# Patient Record
Sex: FEMALE | Race: WHITE | Employment: OTHER | URBAN - METROPOLITAN AREA
[De-identification: names, ages, dates, MRNs, and addresses within clinical notes are randomized per-mention and may not be internally consistent; named-entity substitution may affect disease eponyms.]

---

## 2017-01-05 ENCOUNTER — OFFICE VISIT (OUTPATIENT)
Dept: INTERNAL MEDICINE CLINIC | Facility: CLINIC | Age: 77
End: 2017-01-05

## 2017-01-05 VITALS
DIASTOLIC BLOOD PRESSURE: 80 MMHG | HEART RATE: 69 BPM | SYSTOLIC BLOOD PRESSURE: 136 MMHG | RESPIRATION RATE: 16 BRPM | BODY MASS INDEX: 25.81 KG/M2 | WEIGHT: 162.5 LBS | TEMPERATURE: 98 F | HEIGHT: 66.5 IN | OXYGEN SATURATION: 98 %

## 2017-01-05 DIAGNOSIS — Z12.31 ENCOUNTER FOR SCREENING MAMMOGRAM FOR MALIGNANT NEOPLASM OF BREAST: ICD-10-CM

## 2017-01-05 DIAGNOSIS — Z00.00 ROUTINE GENERAL MEDICAL EXAMINATION AT A HEALTH CARE FACILITY: Primary | ICD-10-CM

## 2017-01-05 DIAGNOSIS — Z13.0 SCREENING, ANEMIA, DEFICIENCY, IRON: ICD-10-CM

## 2017-01-05 DIAGNOSIS — E78.00 PURE HYPERCHOLESTEROLEMIA: ICD-10-CM

## 2017-01-05 DIAGNOSIS — Z13.89 SCREENING FOR BLOOD OR PROTEIN IN URINE: ICD-10-CM

## 2017-01-05 DIAGNOSIS — Z79.899 ENCOUNTER FOR LONG-TERM (CURRENT) USE OF MEDICATIONS: ICD-10-CM

## 2017-01-05 DIAGNOSIS — Z13.29 SCREENING FOR THYROID DISORDER: ICD-10-CM

## 2017-01-05 PROCEDURE — G0439 PPPS, SUBSEQ VISIT: HCPCS | Performed by: INTERNAL MEDICINE

## 2017-01-05 NOTE — PROGRESS NOTES
Felix Sifuentes  10/10/1940 is a 77year old female. Patient presents with: Well Adult: AWV      HPI:   cpx -no new cc    Current Outpatient Prescriptions:  omega-3 fatty acids (FISH OIL) 1000 MG Oral Cap Take 1,000 mg by mouth daily.  Disp:  Rfl:    Ca constipation, diarrhea, difficulty swallowing, change in stools, nausea, vomiting no weight changes noted no heart burn noted. Hematology:   Patient denies abnormal bleeding, easy bleeding, easy bruising. Enlarged lymph nodes none.    Women Only:   Patien non-enlarged. Rebound tenderness: absent. Tenderness: absent . EXTREMITIES:   Clubbing: none. Cyanosis: absent . Edema: none. Pulses: present, bilateral.   Tremors: no.   Varicose veins: not present.    MUSCULOSKELETAL:   Cervical spines: normal issues and agrees to the plan. The patient is asked to Return in about 3 months (around 4/5/2017).   Marcellus Tolliver MD

## 2017-01-20 LAB
ABSOLUTE BASOPHILS: 39 CELLS/UL (ref 0–200)
ABSOLUTE EOSINOPHILS: 157 CELLS/UL (ref 15–500)
ABSOLUTE LYMPHOCYTES: 1982 CELLS/UL (ref 850–3900)
ABSOLUTE MONOCYTES: 364 CELLS/UL (ref 200–950)
ABSOLUTE NEUTROPHILS: 3058 CELLS/UL (ref 1500–7800)
ALBUMIN/GLOBULIN RATIO: 2.1 (CALC) (ref 1–2.5)
ALBUMIN: 4.2 G/DL (ref 3.6–5.1)
ALKALINE PHOSPHATASE: 80 U/L (ref 33–130)
ALT: 12 U/L (ref 6–29)
AST: 19 U/L (ref 10–35)
BASOPHILS: 0.7 %
BILIRUBIN, TOTAL: 0.5 MG/DL (ref 0.2–1.2)
BILIRUBIN: NEGATIVE
BUN: 15 MG/DL (ref 7–25)
CALCIUM: 9.5 MG/DL (ref 8.6–10.4)
CARBON DIOXIDE: 29 MMOL/L (ref 20–31)
CHLORIDE: 106 MMOL/L (ref 98–110)
CHOL/HDLC RATIO: 5 (CALC)
CHOLESTEROL, TOTAL: 230 MG/DL (ref 125–200)
COLOR: YELLOW
CREATININE: 0.89 MG/DL (ref 0.6–0.93)
EGFR IF AFRICN AM: 73 ML/MIN/1.73M2
EGFR IF NONAFRICN AM: 63 ML/MIN/1.73M2
EOSINOPHILS: 2.8 %
GLOBULIN: 2 G/DL (CALC) (ref 1.9–3.7)
GLUCOSE: 84 MG/DL (ref 65–99)
GLUCOSE: NEGATIVE
HDL CHOLESTEROL: 46 MG/DL
HEMATOCRIT: 41 % (ref 35–45)
HEMOGLOBIN A1C: 5.7 % OF TOTAL HGB
HEMOGLOBIN: 14 G/DL (ref 11.7–15.5)
KETONES: NEGATIVE
LDL-CHOLESTEROL: 160 MG/DL (CALC)
LYMPHOCYTES: 35.4 %
MCH: 31.8 PG (ref 27–33)
MCHC: 34.1 G/DL (ref 32–36)
MCV: 93.4 FL (ref 80–100)
MONOCYTES: 6.5 %
MPV: 9.4 FL (ref 7.5–11.5)
NEUTROPHILS: 54.6 %
NITRITE: NEGATIVE
NON-HDL CHOLESTEROL: 184 MG/DL (CALC)
PH: 7.5 (ref 5–8)
PLATELET COUNT: 170 THOUSAND/UL (ref 140–400)
POTASSIUM: 4.6 MMOL/L (ref 3.5–5.3)
PROTEIN, TOTAL: 6.2 G/DL (ref 6.1–8.1)
RDW: 13.3 % (ref 11–15)
RED BLOOD CELL COUNT: 4.39 MILLION/UL (ref 3.8–5.1)
SODIUM: 142 MMOL/L (ref 135–146)
SPECIFIC GRAVITY: 1.02 (ref 1–1.03)
T4 (THYROXINE), TOTAL: 7.5 MCG/DL (ref 4.5–12)
TRIGLYCERIDES: 121 MG/DL
TSH: 4.02 MIU/L (ref 0.4–4.5)
WHITE BLOOD CELL COUNT: 5.6 THOUSAND/UL (ref 3.8–10.8)

## 2017-08-08 ENCOUNTER — HOSPITAL ENCOUNTER (OUTPATIENT)
Dept: MAMMOGRAPHY | Age: 77
Discharge: HOME | End: 2017-08-08
Attending: INTERNAL MEDICINE
Payer: MEDICARE

## 2017-08-08 DIAGNOSIS — Z12.31 ENCOUNTER FOR SCREENING MAMMOGRAM FOR MALIGNANT NEOPLASM OF BREAST: ICD-10-CM

## 2017-08-08 PROCEDURE — 77067 SCR MAMMO BI INCL CAD: CPT | Performed by: INTERNAL MEDICINE

## 2018-01-25 ENCOUNTER — OFFICE VISIT (OUTPATIENT)
Dept: INTERNAL MEDICINE CLINIC | Facility: CLINIC | Age: 78
End: 2018-01-25

## 2018-01-25 VITALS
WEIGHT: 166.75 LBS | HEART RATE: 72 BPM | HEIGHT: 66 IN | DIASTOLIC BLOOD PRESSURE: 86 MMHG | TEMPERATURE: 98 F | RESPIRATION RATE: 14 BRPM | BODY MASS INDEX: 26.8 KG/M2 | OXYGEN SATURATION: 97 % | SYSTOLIC BLOOD PRESSURE: 134 MMHG

## 2018-01-25 DIAGNOSIS — Z00.00 ROUTINE GENERAL MEDICAL EXAMINATION AT A HEALTH CARE FACILITY: Primary | ICD-10-CM

## 2018-01-25 DIAGNOSIS — E78.00 PURE HYPERCHOLESTEROLEMIA: ICD-10-CM

## 2018-01-25 PROCEDURE — G0009 ADMIN PNEUMOCOCCAL VACCINE: HCPCS | Performed by: INTERNAL MEDICINE

## 2018-01-25 PROCEDURE — 93000 ELECTROCARDIOGRAM COMPLETE: CPT | Performed by: INTERNAL MEDICINE

## 2018-01-25 PROCEDURE — 90670 PCV13 VACCINE IM: CPT | Performed by: INTERNAL MEDICINE

## 2018-01-25 PROCEDURE — G0439 PPPS, SUBSEQ VISIT: HCPCS | Performed by: INTERNAL MEDICINE

## 2018-01-25 NOTE — PROGRESS NOTES
Ted Sifuentes  10/10/1940 is a 69year old female. Patient presents with:  Physical: Est Pt. Annual medicare wellness       HPI:   cpx -no new cc    Current Outpatient Prescriptions:  BABY ASPIRIN OR Take by mouth.  Disp:  Rfl:    omega-3 fatty acids ( (paroxsymal nocturnal dyspnea) none. Gastrointestinal:   Patient denies abdominal pain, blood in stool, constipation, diarrhea, difficulty swallowing, change in stools, nausea, vomiting no weight changes noted no heart burn noted.    Hematology:   Patient Percussion: normal.   Rales: no .   Respiratory effort: normal .   Rhonchi: no.   Wheezes: no. ABDOMEN:   General: normal.   Hernia: absent. Inguinal nodes: none. Liver, Spleen: non-enlarged. Rebound tenderness: absent. Tenderness: absent .    E indicates understanding of these issues and agrees to the plan. The patient is asked to Return in about 1 year (around 1/25/2019).   Anjel Carpenter MD

## 2018-02-05 ENCOUNTER — OFFICE VISIT (OUTPATIENT)
Dept: INTERNAL MEDICINE CLINIC | Facility: CLINIC | Age: 78
End: 2018-02-05

## 2018-02-05 VITALS
HEART RATE: 86 BPM | BODY MASS INDEX: 26 KG/M2 | WEIGHT: 164 LBS | TEMPERATURE: 99 F | RESPIRATION RATE: 16 BRPM | DIASTOLIC BLOOD PRESSURE: 80 MMHG | OXYGEN SATURATION: 96 % | SYSTOLIC BLOOD PRESSURE: 152 MMHG

## 2018-02-05 DIAGNOSIS — J04.0 LARYNGITIS: Primary | ICD-10-CM

## 2018-02-05 PROCEDURE — 99213 OFFICE O/P EST LOW 20 MIN: CPT | Performed by: INTERNAL MEDICINE

## 2018-02-05 RX ORDER — GUAIFENESIN AND DEXTROMETHORPHAN HYDROBROMIDE 1200; 60 MG/1; MG/1
1 TABLET, EXTENDED RELEASE ORAL EVERY 12 HOURS
Qty: 20 TABLET | Refills: 0 | Status: SHIPPED | OUTPATIENT
Start: 2018-02-05 | End: 2018-02-15

## 2018-02-05 RX ORDER — PREDNISONE 1 MG/1
TABLET ORAL
Qty: 26 TABLET | Refills: 0 | Status: SHIPPED | OUTPATIENT
Start: 2018-02-05 | End: 2018-03-22

## 2018-02-05 NOTE — PROGRESS NOTES
Shobha Sifuentes  10/10/1940 is a 69year old female who presents for upper respiratory symptoms    Patient presents with:  Cough        HPI:   Pt reports  respiratory symptoms for 2 days. Losing voice nonproductive cough no fever.   No body aches      Curr clear  ENT: ears neg throat neg  NECK: supple, neg adenopathy,no bruits  LUNGS: clear to auscultation. air entry equal.no chest wall tenderness.  wheeze neg  CARDIO: RRR without murmur  GI: good BS's,no masses, HSM or tenderness    ASSESSMENT AND PLAN:   Myah Paredes

## 2018-03-09 LAB
ABSOLUTE BASOPHILS: 62 CELLS/UL (ref 0–200)
ABSOLUTE EOSINOPHILS: 174 CELLS/UL (ref 15–500)
ABSOLUTE LYMPHOCYTES: 1764 CELLS/UL (ref 850–3900)
ABSOLUTE MONOCYTES: 414 CELLS/UL (ref 200–950)
ABSOLUTE NEUTROPHILS: 3186 CELLS/UL (ref 1500–7800)
ALBUMIN/GLOBULIN RATIO: 2.3 (CALC) (ref 1–2.5)
ALBUMIN: 4.1 G/DL (ref 3.6–5.1)
ALKALINE PHOSPHATASE: 73 U/L (ref 33–130)
ALT: 17 U/L (ref 6–29)
AST: 21 U/L (ref 10–35)
BASOPHILS: 1.1 %
BILIRUBIN, TOTAL: 0.5 MG/DL (ref 0.2–1.2)
BILIRUBIN: NEGATIVE
BUN: 12 MG/DL (ref 7–25)
CALCIUM: 9.3 MG/DL (ref 8.6–10.4)
CARBON DIOXIDE: 28 MMOL/L (ref 20–31)
CHLORIDE: 107 MMOL/L (ref 98–110)
CHOL/HDLC RATIO: 5.1 (CALC)
CHOLESTEROL, TOTAL: 209 MG/DL
COLOR: YELLOW
CREATININE: 0.92 MG/DL (ref 0.6–0.93)
EGFR IF AFRICN AM: 70 ML/MIN/1.73M2
EGFR IF NONAFRICN AM: 60 ML/MIN/1.73M2
EOSINOPHILS: 3.1 %
GLOBULIN: 1.8 G/DL (CALC) (ref 1.9–3.7)
GLUCOSE: 91 MG/DL (ref 65–99)
GLUCOSE: NEGATIVE
HDL CHOLESTEROL: 41 MG/DL
HEMATOCRIT: 39.7 % (ref 35–45)
HEMOGLOBIN A1C: 5.3 % OF TOTAL HGB
HEMOGLOBIN: 13.6 G/DL (ref 11.7–15.5)
KETONES: NEGATIVE
LDL-CHOLESTEROL: 141 MG/DL (CALC)
LYMPHOCYTES: 31.5 %
MCH: 31.6 PG (ref 27–33)
MCHC: 34.3 G/DL (ref 32–36)
MCV: 92.3 FL (ref 80–100)
MONOCYTES: 7.4 %
MPV: 11.2 FL (ref 7.5–12.5)
NEUTROPHILS: 56.9 %
NITRITE: NEGATIVE
NON-HDL CHOLESTEROL: 168 MG/DL (CALC)
PH: 7 (ref 5–8)
PLATELET COUNT: 202 THOUSAND/UL (ref 140–400)
POTASSIUM: 4.3 MMOL/L (ref 3.5–5.3)
PROTEIN, TOTAL: 5.9 G/DL (ref 6.1–8.1)
RDW: 12.5 % (ref 11–15)
RED BLOOD CELL COUNT: 4.3 MILLION/UL (ref 3.8–5.1)
SODIUM: 143 MMOL/L (ref 135–146)
SPECIFIC GRAVITY: 1.02 (ref 1–1.03)
T4 (THYROXINE), TOTAL: 6.9 MCG/DL (ref 4.5–12)
TRIGLYCERIDES: 148 MG/DL
TSH: 4.62 MIU/L (ref 0.4–4.5)
WHITE BLOOD CELL COUNT: 5.6 THOUSAND/UL (ref 3.8–10.8)

## 2018-03-22 ENCOUNTER — OFFICE VISIT (OUTPATIENT)
Dept: INTERNAL MEDICINE CLINIC | Facility: CLINIC | Age: 78
End: 2018-03-22

## 2018-03-22 VITALS
WEIGHT: 164 LBS | OXYGEN SATURATION: 96 % | HEIGHT: 66 IN | BODY MASS INDEX: 26.36 KG/M2 | TEMPERATURE: 98 F | DIASTOLIC BLOOD PRESSURE: 82 MMHG | HEART RATE: 72 BPM | SYSTOLIC BLOOD PRESSURE: 142 MMHG | RESPIRATION RATE: 13 BRPM

## 2018-03-22 DIAGNOSIS — E78.00 PURE HYPERCHOLESTEROLEMIA: Primary | ICD-10-CM

## 2018-03-22 DIAGNOSIS — R79.89 ABNORMAL THYROID BLOOD TEST: ICD-10-CM

## 2018-03-22 PROCEDURE — 99213 OFFICE O/P EST LOW 20 MIN: CPT | Performed by: INTERNAL MEDICINE

## 2018-03-22 RX ORDER — PRAVASTATIN SODIUM 20 MG
20 TABLET ORAL NIGHTLY
Qty: 30 TABLET | Refills: 2 | Status: SHIPPED | OUTPATIENT
Start: 2018-03-22 | End: 2018-04-09

## 2018-03-22 NOTE — PROGRESS NOTES
Rossana Sifuentes  10/10/1940 is a 69year old female. Patient presents with:  Lab Results      HPI:   Here for lab discussion.   Agreeable to statin therapy    Current Outpatient Prescriptions:  Pravastatin Sodium (PRAVACHOL) 20 MG Oral Tab Take 1 tablet about 3 months (around 6/22/2018).   Silvina Morris MD

## 2018-09-12 ENCOUNTER — TELEPHONE (OUTPATIENT)
Dept: INTERNAL MEDICINE CLINIC | Facility: CLINIC | Age: 78
End: 2018-09-12

## 2018-09-12 NOTE — TELEPHONE ENCOUNTER
Pt states she was seen by Dr Salud Hicks and referred to Dr Becky Estrella for colonoscopy. Requesting order for colonoscopy. Pt informed no order is needed from our office and to proceed with scheduling appointment with Dr Becky Estrella.

## 2018-09-26 PROBLEM — Z12.11 SPECIAL SCREENING FOR MALIGNANT NEOPLASM OF COLON: Status: ACTIVE | Noted: 2018-09-26

## 2018-10-04 ENCOUNTER — TELEPHONE (OUTPATIENT)
Dept: INTERNAL MEDICINE CLINIC | Facility: CLINIC | Age: 78
End: 2018-10-04

## 2018-10-04 DIAGNOSIS — Z12.39 SCREENING FOR MALIGNANT NEOPLASM OF BREAST: Primary | ICD-10-CM

## 2018-10-09 ENCOUNTER — HOSPITAL ENCOUNTER (OUTPATIENT)
Dept: MAMMOGRAPHY | Age: 78
Discharge: HOME | End: 2018-10-09
Attending: INTERNAL MEDICINE
Payer: MEDICARE

## 2018-10-09 DIAGNOSIS — Z12.39 SCREENING FOR MALIGNANT NEOPLASM OF BREAST: ICD-10-CM

## 2018-10-09 PROCEDURE — 77063 BREAST TOMOSYNTHESIS BI: CPT | Performed by: INTERNAL MEDICINE

## 2018-10-09 PROCEDURE — 77067 SCR MAMMO BI INCL CAD: CPT | Performed by: INTERNAL MEDICINE

## 2019-04-25 ENCOUNTER — OFFICE VISIT (OUTPATIENT)
Dept: INTERNAL MEDICINE CLINIC | Facility: CLINIC | Age: 79
End: 2019-04-25
Payer: MEDICARE

## 2019-04-25 VITALS
WEIGHT: 157.75 LBS | SYSTOLIC BLOOD PRESSURE: 176 MMHG | BODY MASS INDEX: 25.35 KG/M2 | OXYGEN SATURATION: 97 % | TEMPERATURE: 98 F | RESPIRATION RATE: 12 BRPM | HEIGHT: 66.25 IN | HEART RATE: 72 BPM | DIASTOLIC BLOOD PRESSURE: 84 MMHG

## 2019-04-25 DIAGNOSIS — I10 ESSENTIAL HYPERTENSION: ICD-10-CM

## 2019-04-25 DIAGNOSIS — Z00.00 ROUTINE GENERAL MEDICAL EXAMINATION AT A HEALTH CARE FACILITY: Primary | ICD-10-CM

## 2019-04-25 PROBLEM — Z12.11 SPECIAL SCREENING FOR MALIGNANT NEOPLASM OF COLON: Status: RESOLVED | Noted: 2018-09-26 | Resolved: 2019-04-25

## 2019-04-25 PROCEDURE — 96160 PT-FOCUSED HLTH RISK ASSMT: CPT | Performed by: INTERNAL MEDICINE

## 2019-04-25 PROCEDURE — 99397 PER PM REEVAL EST PAT 65+ YR: CPT | Performed by: INTERNAL MEDICINE

## 2019-04-25 PROCEDURE — G0439 PPPS, SUBSEQ VISIT: HCPCS | Performed by: INTERNAL MEDICINE

## 2019-04-25 PROCEDURE — 93000 ELECTROCARDIOGRAM COMPLETE: CPT | Performed by: INTERNAL MEDICINE

## 2019-04-25 RX ORDER — AMLODIPINE BESYLATE AND BENAZEPRIL HYDROCHLORIDE 5; 10 MG/1; MG/1
1 CAPSULE ORAL DAILY
Qty: 30 CAPSULE | Refills: 2 | Status: SHIPPED | OUTPATIENT
Start: 2019-04-25 | End: 2019-05-06

## 2019-04-25 RX ORDER — UBIQUINOL 100 MG
2 CAPSULE ORAL DAILY
COMMUNITY
Start: 2019-01-24

## 2019-04-25 RX ORDER — MULTIVITAMIN
1 TABLET ORAL DAILY
COMMUNITY

## 2019-04-25 NOTE — PROGRESS NOTES
Gokul Sifuentes  10/10/1940 is a 69year old female. Patient presents with:  Physical      HPI:   cpx -no new cc    Current Outpatient Medications:  Glucosamine 750 MG Oral Tab Take 2 tablets by mouth daily.  Disp:  Rfl:    Multiple Vitamin (MULTI-VITAMI neck stiffness or pain. Endocrine:   Diabetes none. Thyroid disorder none. Respiratory:   Patient denies chest pain, cough, MARTINEZ (dyspnea on exertion), chest congestion, blood-tinged sputum/wheezing. Breathing normal pattern .    Cardiovascular:   Patien canals: normal.   EOM: within normal limit-conjunctiva normal.   Head: normocephalic. Nasal septum: midline. Nose: unremarkable. Oral cavity: normal.   Pupils: normal, bilaterally .    Sclera: normal.   Turbinates: normal.   NECK:   Carotid bruit: non general medical examination at a health care facility  -     CBC WITH DIFFERENTIAL WITH PLATELET  -     COMP METABOLIC PANEL (14)  -     ELECTROCARDIOGRAM, COMPLETE  -     HEMOGLOBIN A1C  -     LIPID PANEL  -     T4(THYROXINE TOTAL)  -     ASSAY, THYROID S

## 2019-05-06 ENCOUNTER — TELEPHONE (OUTPATIENT)
Dept: INTERNAL MEDICINE CLINIC | Facility: CLINIC | Age: 79
End: 2019-05-06

## 2019-05-06 DIAGNOSIS — I10 ESSENTIAL HYPERTENSION: ICD-10-CM

## 2019-05-06 RX ORDER — AMLODIPINE BESYLATE AND BENAZEPRIL HYDROCHLORIDE 5; 10 MG/1; MG/1
1 CAPSULE ORAL DAILY
Qty: 90 CAPSULE | Refills: 0 | Status: SHIPPED | OUTPATIENT
Start: 2019-05-06 | End: 2019-08-21

## 2019-05-06 NOTE — TELEPHONE ENCOUNTER
Medication sent to mail order pharmacy and cancelled at local pharmacy. Pt notified and she verbalized understanding.

## 2019-05-06 NOTE — TELEPHONE ENCOUNTER
Pt pharamcy is [facility] (not [place]) resend rx to [facility] order     amLODIPine Besy-Benazepril HCl (LOTREL) 5-10 MG Oral Cap

## 2019-06-10 ENCOUNTER — OFFICE VISIT (OUTPATIENT)
Dept: INTERNAL MEDICINE CLINIC | Facility: CLINIC | Age: 79
End: 2019-06-10
Payer: MEDICARE

## 2019-06-10 VITALS
TEMPERATURE: 98 F | DIASTOLIC BLOOD PRESSURE: 74 MMHG | HEART RATE: 80 BPM | BODY MASS INDEX: 25.59 KG/M2 | RESPIRATION RATE: 16 BRPM | HEIGHT: 66.25 IN | SYSTOLIC BLOOD PRESSURE: 126 MMHG | WEIGHT: 159.25 LBS | OXYGEN SATURATION: 97 %

## 2019-06-10 DIAGNOSIS — I10 ESSENTIAL HYPERTENSION: Primary | ICD-10-CM

## 2019-06-10 PROCEDURE — 99213 OFFICE O/P EST LOW 20 MIN: CPT | Performed by: INTERNAL MEDICINE

## 2019-06-10 NOTE — PROGRESS NOTES
Troy Sifuentes  10/10/1940 is a 69year old female. Patient presents with:  Medication Follow-Up       HPI:       Current Outpatient Medications:  amLODIPine Besy-Benazepril HCl (LOTREL) 5-10 MG Oral Cap Take 1 capsule by mouth daily.  Disp: 90 capsule normal .   Ears: unremarkable. Mouth: unremarkable. Nasal septum: midline. Pharynx: normal.   Sinuses: non-tender. HEART:   Clicks: no.   Distal Pulses Palpable: yes. Edema: none visible . Gallop: no .   Heart sounds: normal S1S2.    Murmurs: no

## 2019-06-13 ENCOUNTER — APPOINTMENT (OUTPATIENT)
Dept: LAB | Age: 79
End: 2019-06-13
Attending: INTERNAL MEDICINE
Payer: MEDICARE

## 2019-06-13 PROCEDURE — 80053 COMPREHEN METABOLIC PANEL: CPT | Performed by: INTERNAL MEDICINE

## 2019-06-13 PROCEDURE — 36415 COLL VENOUS BLD VENIPUNCTURE: CPT | Performed by: INTERNAL MEDICINE

## 2019-06-13 PROCEDURE — 83036 HEMOGLOBIN GLYCOSYLATED A1C: CPT | Performed by: INTERNAL MEDICINE

## 2019-06-13 PROCEDURE — 85025 COMPLETE CBC W/AUTO DIFF WBC: CPT | Performed by: INTERNAL MEDICINE

## 2019-06-13 PROCEDURE — 80061 LIPID PANEL: CPT | Performed by: INTERNAL MEDICINE

## 2019-06-13 PROCEDURE — 81001 URINALYSIS AUTO W/SCOPE: CPT | Performed by: INTERNAL MEDICINE

## 2019-06-13 PROCEDURE — 84443 ASSAY THYROID STIM HORMONE: CPT | Performed by: INTERNAL MEDICINE

## 2019-06-13 PROCEDURE — 84436 ASSAY OF TOTAL THYROXINE: CPT | Performed by: INTERNAL MEDICINE

## 2019-06-14 ENCOUNTER — TELEPHONE (OUTPATIENT)
Dept: INTERNAL MEDICINE CLINIC | Facility: CLINIC | Age: 79
End: 2019-06-14

## 2019-06-14 DIAGNOSIS — R82.90 ABNORMAL URINALYSIS: Primary | ICD-10-CM

## 2019-06-14 DIAGNOSIS — E78.00 PURE HYPERCHOLESTEROLEMIA: Chronic | ICD-10-CM

## 2019-06-14 RX ORDER — PRAVASTATIN SODIUM 20 MG
20 TABLET ORAL NIGHTLY
Qty: 90 TABLET | Refills: 3 | Status: SHIPPED | OUTPATIENT
Start: 2019-06-14 | End: 2019-09-12

## 2019-06-14 NOTE — TELEPHONE ENCOUNTER
Notes recorded by Alondra Keith MD on 6/14/2019 at 9:23 AM CDT  Reviewed results   LDL borderline high at 129  .  If she agreeable to taking medicines.  Also urine seems to be contaminated would like to repeat her urinalysis and urine culture  Rest of th

## 2019-06-17 ENCOUNTER — APPOINTMENT (OUTPATIENT)
Dept: LAB | Age: 79
End: 2019-06-17
Attending: INTERNAL MEDICINE
Payer: MEDICARE

## 2019-06-17 DIAGNOSIS — R82.90 ABNORMAL URINALYSIS: ICD-10-CM

## 2019-06-17 PROCEDURE — 81001 URINALYSIS AUTO W/SCOPE: CPT

## 2019-06-17 PROCEDURE — 87086 URINE CULTURE/COLONY COUNT: CPT

## 2019-08-01 ENCOUNTER — APPOINTMENT (OUTPATIENT)
Dept: LAB | Age: 79
End: 2019-08-01
Attending: INTERNAL MEDICINE
Payer: MEDICARE

## 2019-08-01 DIAGNOSIS — E78.00 PURE HYPERCHOLESTEROLEMIA: Chronic | ICD-10-CM

## 2019-08-01 LAB
ALBUMIN SERPL-MCNC: 3.6 G/DL (ref 3.4–5)
ALP LIVER SERPL-CCNC: 82 U/L (ref 55–142)
ALT SERPL-CCNC: 21 U/L (ref 13–56)
AST SERPL-CCNC: 17 U/L (ref 15–37)
BILIRUB DIRECT SERPL-MCNC: <0.1 MG/DL (ref 0–0.2)
BILIRUB SERPL-MCNC: 0.5 MG/DL (ref 0.1–2)
CHOLEST SMN-MCNC: 144 MG/DL (ref ?–200)
HDLC SERPL-MCNC: 40 MG/DL (ref 40–59)
LDLC SERPL CALC-MCNC: 76 MG/DL (ref ?–100)
M PROTEIN MFR SERPL ELPH: 6.2 G/DL (ref 6.4–8.2)
NONHDLC SERPL-MCNC: 104 MG/DL (ref ?–130)
TRIGL SERPL-MCNC: 138 MG/DL (ref 30–149)
VLDLC SERPL CALC-MCNC: 28 MG/DL (ref 0–30)

## 2019-08-01 PROCEDURE — 36415 COLL VENOUS BLD VENIPUNCTURE: CPT

## 2019-08-01 PROCEDURE — 80061 LIPID PANEL: CPT

## 2019-08-01 PROCEDURE — 80076 HEPATIC FUNCTION PANEL: CPT

## 2019-08-06 ENCOUNTER — TELEPHONE (OUTPATIENT)
Dept: INTERNAL MEDICINE CLINIC | Facility: CLINIC | Age: 79
End: 2019-08-06

## 2019-08-06 DIAGNOSIS — E78.00 PURE HYPERCHOLESTEROLEMIA: Primary | Chronic | ICD-10-CM

## 2019-08-06 DIAGNOSIS — I10 ESSENTIAL HYPERTENSION: Chronic | ICD-10-CM

## 2019-08-06 NOTE — TELEPHONE ENCOUNTER
Notes recorded by Desirae Rubalcava MD on 8/1/2019 at 4:36 PM CDT  Reviewed results   Within acceptable limits continue present management recheck in 6 months    Left detailed message with results. - ok per HIPAA  Repeat labs ordered.

## 2019-08-12 DIAGNOSIS — I10 ESSENTIAL HYPERTENSION: ICD-10-CM

## 2019-08-21 RX ORDER — AMLODIPINE BESYLATE AND BENAZEPRIL HYDROCHLORIDE 5; 10 MG/1; MG/1
1 CAPSULE ORAL DAILY
Qty: 90 CAPSULE | Refills: 0 | Status: SHIPPED | OUTPATIENT
Start: 2019-08-21 | End: 2019-11-18

## 2019-08-21 NOTE — TELEPHONE ENCOUNTER
Passed protocol    Medication(s) to Refill:   Requested Prescriptions     Pending Prescriptions Disp Refills   • amLODIPine Besy-Benazepril HCl (LOTREL) 5-10 MG Oral Cap 90 capsule 0     Sig: Take 1 capsule by mouth daily.        Last Time Medication was Fi

## 2019-10-15 ENCOUNTER — TELEPHONE (OUTPATIENT)
Dept: INTERNAL MEDICINE CLINIC | Facility: CLINIC | Age: 79
End: 2019-10-15

## 2019-10-15 DIAGNOSIS — Z12.31 ENCOUNTER FOR SCREENING MAMMOGRAM FOR BREAST CANCER: Primary | ICD-10-CM

## 2019-10-15 NOTE — TELEPHONE ENCOUNTER
Patient called in requesting order for her yearly Mammogram screening. Please call patient back once order is put in.

## 2019-10-17 NOTE — TELEPHONE ENCOUNTER
Left detailed message notifying pt mammogram order placed with contact information to central scheduling. Advised for pt to call office with questions/concerns.

## 2019-10-18 ENCOUNTER — HOSPITAL ENCOUNTER (OUTPATIENT)
Dept: MAMMOGRAPHY | Age: 79
Discharge: HOME | End: 2019-10-18
Attending: INTERNAL MEDICINE
Payer: MEDICARE

## 2019-10-18 DIAGNOSIS — Z12.31 ENCOUNTER FOR SCREENING MAMMOGRAM FOR BREAST CANCER: ICD-10-CM

## 2019-10-18 PROCEDURE — 77063 BREAST TOMOSYNTHESIS BI: CPT | Performed by: INTERNAL MEDICINE

## 2019-10-18 PROCEDURE — 77067 SCR MAMMO BI INCL CAD: CPT | Performed by: INTERNAL MEDICINE

## 2019-11-18 DIAGNOSIS — I10 ESSENTIAL HYPERTENSION: ICD-10-CM

## 2019-11-19 RX ORDER — AMLODIPINE BESYLATE AND BENAZEPRIL HYDROCHLORIDE 5; 10 MG/1; MG/1
CAPSULE ORAL
Qty: 90 CAPSULE | Refills: 0 | Status: SHIPPED | OUTPATIENT
Start: 2019-11-19 | End: 2020-01-22

## 2019-11-19 NOTE — TELEPHONE ENCOUNTER
Amlodipine  Benazepril 5-10 mg 1 cap daily filled 8/21/19 90 with 0 refills     LOV 6/10/19  Return in about 6 months (around 12/10/2019). .  No upcoming apt on file   Labs 6/13/19

## 2020-01-03 ENCOUNTER — OFFICE VISIT (OUTPATIENT)
Dept: INTERNAL MEDICINE CLINIC | Facility: CLINIC | Age: 80
End: 2020-01-03
Payer: MEDICARE

## 2020-01-03 VITALS
RESPIRATION RATE: 14 BRPM | BODY MASS INDEX: 25.43 KG/M2 | HEART RATE: 79 BPM | DIASTOLIC BLOOD PRESSURE: 68 MMHG | OXYGEN SATURATION: 98 % | SYSTOLIC BLOOD PRESSURE: 110 MMHG | WEIGHT: 158.25 LBS | TEMPERATURE: 98 F | HEIGHT: 66.25 IN

## 2020-01-03 DIAGNOSIS — R73.03 PREDIABETES: ICD-10-CM

## 2020-01-03 DIAGNOSIS — E78.00 PURE HYPERCHOLESTEROLEMIA: Chronic | ICD-10-CM

## 2020-01-03 DIAGNOSIS — I10 ESSENTIAL HYPERTENSION: Primary | Chronic | ICD-10-CM

## 2020-01-03 PROBLEM — R79.89 ABNORMAL THYROID BLOOD TEST: Status: RESOLVED | Noted: 2018-03-22 | Resolved: 2020-01-03

## 2020-01-03 PROCEDURE — 99213 OFFICE O/P EST LOW 20 MIN: CPT | Performed by: INTERNAL MEDICINE

## 2020-01-03 RX ORDER — PRAVASTATIN SODIUM 20 MG
1 TABLET ORAL DAILY
COMMUNITY
Start: 2019-11-19 | End: 2020-04-07

## 2020-01-03 NOTE — PROGRESS NOTES
Jayde Sifuentes  10/10/1940 is a 67year old female.     Patient presents with:  Hypertension       HPI:    6Month check no complaints  Current Outpatient Medications   Medication Sig Dispense Refill   • Pravastatin Sodium 20 MG Oral Tab Take 1 tablet by mo HEENT:   jvp not raised. Ear canals: normal.   Ear drums: normal .   Ears: unremarkable. Mouth: unremarkable. Nasal septum: midline. Pharynx: normal.   Sinuses: non-tender. HEART:   Clicks: no.   Distal Pulses Palpable: yes.    Edema: none visib

## 2020-01-16 ENCOUNTER — LAB ENCOUNTER (OUTPATIENT)
Dept: LAB | Age: 80
End: 2020-01-16
Attending: INTERNAL MEDICINE
Payer: MEDICARE

## 2020-01-16 DIAGNOSIS — E78.00 PURE HYPERCHOLESTEROLEMIA: Chronic | ICD-10-CM

## 2020-01-16 DIAGNOSIS — I10 ESSENTIAL HYPERTENSION: ICD-10-CM

## 2020-01-16 LAB
ALBUMIN SERPL-MCNC: 3.8 G/DL (ref 3.4–5)
ALBUMIN/GLOB SERPL: 1.2 {RATIO} (ref 1–2)
ALP LIVER SERPL-CCNC: 83 U/L (ref 55–142)
ALT SERPL-CCNC: 27 U/L (ref 13–56)
ANION GAP SERPL CALC-SCNC: 6 MMOL/L (ref 0–18)
AST SERPL-CCNC: 20 U/L (ref 15–37)
BILIRUB DIRECT SERPL-MCNC: <0.1 MG/DL (ref 0–0.2)
BILIRUB SERPL-MCNC: 0.4 MG/DL (ref 0.1–2)
BUN BLD-MCNC: 14 MG/DL (ref 7–18)
BUN/CREAT SERPL: 20 (ref 10–20)
CALCIUM BLD-MCNC: 9.3 MG/DL (ref 8.5–10.1)
CHLORIDE SERPL-SCNC: 109 MMOL/L (ref 98–112)
CHOLEST SMN-MCNC: 181 MG/DL (ref ?–200)
CO2 SERPL-SCNC: 27 MMOL/L (ref 21–32)
CREAT BLD-MCNC: 0.7 MG/DL (ref 0.55–1.02)
EST. AVERAGE GLUCOSE BLD GHB EST-MCNC: 111 MG/DL (ref 68–126)
GLOBULIN PLAS-MCNC: 3.3 G/DL (ref 2.8–4.4)
GLUCOSE BLD-MCNC: 86 MG/DL (ref 70–99)
HBA1C MFR BLD HPLC: 5.5 % (ref ?–5.7)
HDLC SERPL-MCNC: 46 MG/DL (ref 40–59)
LDLC SERPL CALC-MCNC: 105 MG/DL (ref ?–100)
M PROTEIN MFR SERPL ELPH: 7.1 G/DL (ref 6.4–8.2)
NONHDLC SERPL-MCNC: 135 MG/DL (ref ?–130)
OSMOLALITY SERPL CALC.SUM OF ELEC: 294 MOSM/KG (ref 275–295)
PATIENT FASTING Y/N/NP: YES
PATIENT FASTING Y/N/NP: YES
POTASSIUM SERPL-SCNC: 4.6 MMOL/L (ref 3.5–5.1)
SODIUM SERPL-SCNC: 142 MMOL/L (ref 136–145)
TRIGL SERPL-MCNC: 150 MG/DL (ref 30–149)
VLDLC SERPL CALC-MCNC: 30 MG/DL (ref 0–30)

## 2020-01-16 PROCEDURE — 36415 COLL VENOUS BLD VENIPUNCTURE: CPT | Performed by: INTERNAL MEDICINE

## 2020-01-16 PROCEDURE — 80061 LIPID PANEL: CPT

## 2020-01-16 PROCEDURE — 82248 BILIRUBIN DIRECT: CPT

## 2020-01-16 PROCEDURE — 83036 HEMOGLOBIN GLYCOSYLATED A1C: CPT | Performed by: INTERNAL MEDICINE

## 2020-01-16 PROCEDURE — 80053 COMPREHEN METABOLIC PANEL: CPT | Performed by: INTERNAL MEDICINE

## 2020-01-20 DIAGNOSIS — I10 ESSENTIAL HYPERTENSION: ICD-10-CM

## 2020-01-22 RX ORDER — AMLODIPINE BESYLATE AND BENAZEPRIL HYDROCHLORIDE 5; 10 MG/1; MG/1
1 CAPSULE ORAL DAILY
Qty: 90 CAPSULE | Refills: 1 | Status: SHIPPED | OUTPATIENT
Start: 2020-01-22 | End: 2020-06-02

## 2020-01-22 NOTE — TELEPHONE ENCOUNTER
Passed protocol    Requesting AMLODIPINE BESY-BENAZEPRIL HCL 5-10 MG Oral Cap  LOV: 1/3/2020  RTC: 6 months  Last Relevant Labs: 1/16/2020  Filled: 11/19/19 #90 with 0 refills    No future appointments.

## 2020-01-24 ENCOUNTER — TELEPHONE (OUTPATIENT)
Dept: INTERNAL MEDICINE CLINIC | Facility: CLINIC | Age: 80
End: 2020-01-24

## 2020-01-24 DIAGNOSIS — E78.00 PURE HYPERCHOLESTEROLEMIA: Primary | Chronic | ICD-10-CM

## 2020-01-24 NOTE — TELEPHONE ENCOUNTER
Notes recorded by Cathie Reynolds MD on 1/17/2020 at 10:44 AM CST  Reviewed results   LDL marginally elevated at 105.  Recheck in 4 months    Pt notified of results and provider instructions. Pt verbalized understanding.   Repeat lab ordered

## 2020-04-07 RX ORDER — PRAVASTATIN SODIUM 20 MG
TABLET ORAL
Qty: 90 TABLET | Refills: 0 | Status: SHIPPED | OUTPATIENT
Start: 2020-04-07 | End: 2020-06-01

## 2020-06-01 DIAGNOSIS — I10 ESSENTIAL HYPERTENSION: ICD-10-CM

## 2020-06-01 RX ORDER — PRAVASTATIN SODIUM 20 MG
TABLET ORAL
Qty: 90 TABLET | Refills: 0 | Status: SHIPPED | OUTPATIENT
Start: 2020-06-01 | End: 2020-08-08

## 2020-06-01 NOTE — TELEPHONE ENCOUNTER
PRAVASTATIN SODIUM 20 MG Tablet  Protocol Passed     Called patient and left a detailed message to give the office back a call to schedule an appt for her cpx and bp follow up     LOV: 1/3/2020   RTC: 6 months   Upcoming OV: none scheduled   Filled: 4/7/20

## 2020-06-02 RX ORDER — AMLODIPINE BESYLATE AND BENAZEPRIL HYDROCHLORIDE 5; 10 MG/1; MG/1
CAPSULE ORAL
Qty: 90 CAPSULE | Refills: 1 | Status: SHIPPED | OUTPATIENT
Start: 2020-06-02 | End: 2020-11-16

## 2020-06-02 NOTE — TELEPHONE ENCOUNTER
Refill requested:   Requested Prescriptions     Pending Prescriptions Disp Refills   • AMLODIPINE BESY-BENAZEPRIL HCL 5-10 MG Oral Cap [Pharmacy Med Name: AMLODIPINE BESYLATE/BENAZEPRIL HYDROCHLORIDE 5-10 MG Capsule] 90 capsule 1     Sig: TAKE 1 CAPSULE EV

## 2020-06-26 ENCOUNTER — OFFICE VISIT (OUTPATIENT)
Dept: INTERNAL MEDICINE CLINIC | Facility: CLINIC | Age: 80
End: 2020-06-26
Payer: MEDICARE

## 2020-06-26 VITALS
BODY MASS INDEX: 24.86 KG/M2 | HEIGHT: 66.5 IN | TEMPERATURE: 98 F | RESPIRATION RATE: 16 BRPM | WEIGHT: 156.5 LBS | OXYGEN SATURATION: 97 % | SYSTOLIC BLOOD PRESSURE: 120 MMHG | DIASTOLIC BLOOD PRESSURE: 68 MMHG | HEART RATE: 72 BPM

## 2020-06-26 DIAGNOSIS — M81.0 SENILE OSTEOPOROSIS: ICD-10-CM

## 2020-06-26 DIAGNOSIS — I10 ESSENTIAL HYPERTENSION: Chronic | ICD-10-CM

## 2020-06-26 DIAGNOSIS — Z00.00 ROUTINE GENERAL MEDICAL EXAMINATION AT A HEALTH CARE FACILITY: Primary | ICD-10-CM

## 2020-06-26 DIAGNOSIS — E78.00 PURE HYPERCHOLESTEROLEMIA: Chronic | ICD-10-CM

## 2020-06-26 DIAGNOSIS — C44.90 SKIN CANCER: ICD-10-CM

## 2020-06-26 PROCEDURE — 96160 PT-FOCUSED HLTH RISK ASSMT: CPT | Performed by: INTERNAL MEDICINE

## 2020-06-26 PROCEDURE — 99397 PER PM REEVAL EST PAT 65+ YR: CPT | Performed by: INTERNAL MEDICINE

## 2020-06-26 PROCEDURE — G0439 PPPS, SUBSEQ VISIT: HCPCS | Performed by: INTERNAL MEDICINE

## 2020-06-26 PROCEDURE — 93000 ELECTROCARDIOGRAM COMPLETE: CPT | Performed by: INTERNAL MEDICINE

## 2020-06-26 NOTE — PROGRESS NOTES
REASON FOR VISIT:    Todd Real is a 67year old female who presents for a Medicare Annual Wellness visit.     Female      Patient Care Team: Patient Care Team:  Leticia Marte MD as PCP - General (Internal Medicine)  Bettie Bay MD (OPHTHALMOLOGY) 12/22/2015 11/14/2014   AST 15 - 37 U/L 20 17 19 - - - -   AST (SGOT) 10 - 35 U/L - - - 21 19 19 19   ALT 13 - 56 U/L 27 21 24 - - - -   ALT (SGPT) 6 - 29 U/L - - - 17 12 13 17     TSH and Free T4 Latest Ref Rng & Units 6/13/2019 3/8/2018 1/19/2017   TSH 0 Cognitive Assessment     What day of the week is this?: Correct  What month is it?: Correct  What year is it?: Correct  Recall \"Ball\": Correct  Recall \"Flag\": Correct  Recall \"Tree\": Correct         PREVENTATIVE SERVICES   INDICATIONS AND SCHEDUL ARM/ELBOW SURGERY UNLISTED      Broken arm      Family History   Problem Relation Age of Onset   • Diabetes Father    • Diabetes Mother    • Other (Liver failure) Other         Sibling, Family histroy of    • Ulcerative Colitis Son      Immunization Histor in stool, constipation, diarrhea, difficulty swallowing, change in stools, nausea, vomiting no weight changes noted no heart burn noted. Hematology:   Patient denies abnormal bleeding, easy bleeding, easy bruising. Enlarged lymph nodes none.    Women Only normal.   Rales: no .   Respiratory effort: normal .   Rhonchi: no.   Wheezes: no. ABDOMEN:   General: normal.   Hernia: absent. Inguinal nodes: none. Liver, Spleen: non-enlarged. Rebound tenderness: absent. Tenderness: absent .    EXTREMITIES: BONE DENSITOMETRY (CPT=77080);  Future    Skin cancer  -     DERM - INTERNAL    Other orders  -     Cancel: ELECTROCARDIOGRAM, COMPLETE    EKG shows the patient have a heart rate of 55 MI interval 170 ms sinus bradycardia negative T waves in the anterior le

## 2020-07-01 ENCOUNTER — APPOINTMENT (OUTPATIENT)
Dept: LAB | Age: 80
End: 2020-07-01
Attending: INTERNAL MEDICINE
Payer: MEDICARE

## 2020-07-01 DIAGNOSIS — E78.00 PURE HYPERCHOLESTEROLEMIA: Chronic | ICD-10-CM

## 2020-07-01 LAB
ALBUMIN SERPL-MCNC: 3.7 G/DL (ref 3.4–5)
ALBUMIN/GLOB SERPL: 1.4 {RATIO} (ref 1–2)
ALP LIVER SERPL-CCNC: 80 U/L (ref 55–142)
ALT SERPL-CCNC: 23 U/L (ref 13–56)
ANION GAP SERPL CALC-SCNC: 2 MMOL/L (ref 0–18)
AST SERPL-CCNC: 19 U/L (ref 15–37)
BASOPHILS # BLD AUTO: 0.07 X10(3) UL (ref 0–0.2)
BASOPHILS NFR BLD AUTO: 1 %
BILIRUB SERPL-MCNC: 0.4 MG/DL (ref 0.1–2)
BILIRUB UR QL STRIP.AUTO: NEGATIVE
BUN BLD-MCNC: 12 MG/DL (ref 7–18)
BUN/CREAT SERPL: 14.1 (ref 10–20)
CALCIUM BLD-MCNC: 9.1 MG/DL (ref 8.5–10.1)
CHLORIDE SERPL-SCNC: 110 MMOL/L (ref 98–112)
CHOLEST SMN-MCNC: 166 MG/DL (ref ?–200)
CO2 SERPL-SCNC: 29 MMOL/L (ref 21–32)
COLOR UR AUTO: YELLOW
CREAT BLD-MCNC: 0.85 MG/DL (ref 0.55–1.02)
DEPRECATED RDW RBC AUTO: 43.3 FL (ref 35.1–46.3)
EOSINOPHIL # BLD AUTO: 0.15 X10(3) UL (ref 0–0.7)
EOSINOPHIL NFR BLD AUTO: 2.2 %
ERYTHROCYTE [DISTWIDTH] IN BLOOD BY AUTOMATED COUNT: 12 % (ref 11–15)
EST. AVERAGE GLUCOSE BLD GHB EST-MCNC: 114 MG/DL (ref 68–126)
GLOBULIN PLAS-MCNC: 2.6 G/DL (ref 2.8–4.4)
GLUCOSE BLD-MCNC: 89 MG/DL (ref 70–99)
GLUCOSE UR STRIP.AUTO-MCNC: NEGATIVE MG/DL
HBA1C MFR BLD HPLC: 5.6 % (ref ?–5.7)
HCT VFR BLD AUTO: 41.3 % (ref 35–48)
HDLC SERPL-MCNC: 41 MG/DL (ref 40–59)
HGB BLD-MCNC: 13.4 G/DL (ref 12–16)
IMM GRANULOCYTES # BLD AUTO: 0.02 X10(3) UL (ref 0–1)
IMM GRANULOCYTES NFR BLD: 0.3 %
KETONES UR STRIP.AUTO-MCNC: NEGATIVE MG/DL
LDLC SERPL CALC-MCNC: 90 MG/DL (ref ?–100)
LYMPHOCYTES # BLD AUTO: 1.96 X10(3) UL (ref 1–4)
LYMPHOCYTES NFR BLD AUTO: 28.6 %
M PROTEIN MFR SERPL ELPH: 6.3 G/DL (ref 6.4–8.2)
MCH RBC QN AUTO: 31.7 PG (ref 26–34)
MCHC RBC AUTO-ENTMCNC: 32.4 G/DL (ref 31–37)
MCV RBC AUTO: 97.6 FL (ref 80–100)
MONOCYTES # BLD AUTO: 0.64 X10(3) UL (ref 0.1–1)
MONOCYTES NFR BLD AUTO: 9.3 %
NEUTROPHILS # BLD AUTO: 4.02 X10 (3) UL (ref 1.5–7.7)
NEUTROPHILS # BLD AUTO: 4.02 X10(3) UL (ref 1.5–7.7)
NEUTROPHILS NFR BLD AUTO: 58.6 %
NITRITE UR QL STRIP.AUTO: NEGATIVE
NONHDLC SERPL-MCNC: 125 MG/DL (ref ?–130)
OSMOLALITY SERPL CALC.SUM OF ELEC: 291 MOSM/KG (ref 275–295)
PATIENT FASTING Y/N/NP: YES
PATIENT FASTING Y/N/NP: YES
PH UR STRIP.AUTO: 7 [PH] (ref 4.5–8)
PLATELET # BLD AUTO: 199 10(3)UL (ref 150–450)
POTASSIUM SERPL-SCNC: 4.1 MMOL/L (ref 3.5–5.1)
PROT UR STRIP.AUTO-MCNC: NEGATIVE MG/DL
RBC # BLD AUTO: 4.23 X10(6)UL (ref 3.8–5.3)
SODIUM SERPL-SCNC: 141 MMOL/L (ref 136–145)
SP GR UR STRIP.AUTO: 1.01 (ref 1–1.03)
T4 FREE SERPL-MCNC: 0.9 NG/DL (ref 0.8–1.7)
TRIGL SERPL-MCNC: 175 MG/DL (ref 30–149)
TSI SER-ACNC: 3.81 MIU/ML (ref 0.36–3.74)
UROBILINOGEN UR STRIP.AUTO-MCNC: <2 MG/DL
VLDLC SERPL CALC-MCNC: 35 MG/DL (ref 0–30)
WBC # BLD AUTO: 6.9 X10(3) UL (ref 4–11)

## 2020-07-01 PROCEDURE — 83036 HEMOGLOBIN GLYCOSYLATED A1C: CPT | Performed by: INTERNAL MEDICINE

## 2020-07-01 PROCEDURE — 84439 ASSAY OF FREE THYROXINE: CPT | Performed by: INTERNAL MEDICINE

## 2020-07-01 PROCEDURE — 85025 COMPLETE CBC W/AUTO DIFF WBC: CPT | Performed by: INTERNAL MEDICINE

## 2020-07-01 PROCEDURE — 81001 URINALYSIS AUTO W/SCOPE: CPT | Performed by: INTERNAL MEDICINE

## 2020-07-01 PROCEDURE — 84443 ASSAY THYROID STIM HORMONE: CPT | Performed by: INTERNAL MEDICINE

## 2020-07-01 PROCEDURE — 80053 COMPREHEN METABOLIC PANEL: CPT | Performed by: INTERNAL MEDICINE

## 2020-07-01 PROCEDURE — 36415 COLL VENOUS BLD VENIPUNCTURE: CPT | Performed by: INTERNAL MEDICINE

## 2020-07-01 PROCEDURE — 80061 LIPID PANEL: CPT

## 2020-07-03 ENCOUNTER — HOSPITAL ENCOUNTER (OUTPATIENT)
Dept: BONE DENSITY | Age: 80
Discharge: HOME | End: 2020-07-03
Attending: INTERNAL MEDICINE
Payer: MEDICARE

## 2020-07-03 DIAGNOSIS — M81.0 SENILE OSTEOPOROSIS: ICD-10-CM

## 2020-07-03 PROCEDURE — 77080 DXA BONE DENSITY AXIAL: CPT | Performed by: INTERNAL MEDICINE

## 2020-07-18 ENCOUNTER — OFFICE VISIT (OUTPATIENT)
Dept: INTERNAL MEDICINE CLINIC | Facility: CLINIC | Age: 80
End: 2020-07-18
Payer: MEDICARE

## 2020-07-18 VITALS
RESPIRATION RATE: 18 BRPM | OXYGEN SATURATION: 97 % | TEMPERATURE: 98 F | BODY MASS INDEX: 24.46 KG/M2 | DIASTOLIC BLOOD PRESSURE: 70 MMHG | HEART RATE: 54 BPM | HEIGHT: 66.5 IN | WEIGHT: 154 LBS | SYSTOLIC BLOOD PRESSURE: 128 MMHG

## 2020-07-18 DIAGNOSIS — I10 ESSENTIAL HYPERTENSION: Primary | ICD-10-CM

## 2020-07-18 DIAGNOSIS — R82.90 ABNORMAL URINE: ICD-10-CM

## 2020-07-18 LAB
BILIRUB UR QL STRIP.AUTO: NEGATIVE
COLOR UR AUTO: YELLOW
GLUCOSE UR STRIP.AUTO-MCNC: NEGATIVE MG/DL
HYALINE CASTS #/AREA URNS AUTO: PRESENT /LPF
KETONES UR STRIP.AUTO-MCNC: NEGATIVE MG/DL
NITRITE UR QL STRIP.AUTO: NEGATIVE
PH UR STRIP.AUTO: 5 [PH] (ref 4.5–8)
PROT UR STRIP.AUTO-MCNC: NEGATIVE MG/DL
RBC #/AREA URNS AUTO: >10 /HPF
SP GR UR STRIP.AUTO: 1.02 (ref 1–1.03)
UROBILINOGEN UR STRIP.AUTO-MCNC: <2 MG/DL
WBC #/AREA URNS AUTO: >50 /HPF

## 2020-07-18 PROCEDURE — 81001 URINALYSIS AUTO W/SCOPE: CPT | Performed by: INTERNAL MEDICINE

## 2020-07-18 PROCEDURE — 90732 PPSV23 VACC 2 YRS+ SUBQ/IM: CPT | Performed by: INTERNAL MEDICINE

## 2020-07-18 PROCEDURE — 99213 OFFICE O/P EST LOW 20 MIN: CPT | Performed by: INTERNAL MEDICINE

## 2020-07-18 PROCEDURE — 3074F SYST BP LT 130 MM HG: CPT | Performed by: INTERNAL MEDICINE

## 2020-07-18 PROCEDURE — 87086 URINE CULTURE/COLONY COUNT: CPT | Performed by: INTERNAL MEDICINE

## 2020-07-18 PROCEDURE — G0009 ADMIN PNEUMOCOCCAL VACCINE: HCPCS | Performed by: INTERNAL MEDICINE

## 2020-07-18 PROCEDURE — 3078F DIAST BP <80 MM HG: CPT | Performed by: INTERNAL MEDICINE

## 2020-07-18 PROCEDURE — 3008F BODY MASS INDEX DOCD: CPT | Performed by: INTERNAL MEDICINE

## 2020-07-18 NOTE — PROGRESS NOTES
Pelon Sifuentes  10/10/1940 is a 67year old female.     Patient presents with:  Test Results       HPI:   For lab results currently has no urine symptoms  Current Outpatient Medications   Medication Sig Dispense Refill   • AMLODIPINE BESY-BENAZEPRIL HCL 5- normal .   Ears: unremarkable. Mouth: unremarkable. Nasal septum: midline. Pharynx: normal.   Sinuses: non-tender. HEART:   Clicks: no.   Distal Pulses Palpable: yes. Edema: none visible . Gallop: no .   Heart sounds: normal S1S2.    Murmurs: no

## 2020-08-08 RX ORDER — PRAVASTATIN SODIUM 20 MG
TABLET ORAL
Qty: 90 TABLET | Refills: 0 | Status: SHIPPED | OUTPATIENT
Start: 2020-08-08 | End: 2020-10-26

## 2020-08-08 NOTE — TELEPHONE ENCOUNTER
Passed protocol      Requesting PRAVASTATIN SODIUM 20 MG Oral Tab  LOV: 7/18/20  RTC: 6 months  Last Relevant Labs: 7/1/20  Filled: 6/1/20 #90 with 0 refills    No future appointments.

## 2020-10-06 ENCOUNTER — TELEPHONE (OUTPATIENT)
Dept: INTERNAL MEDICINE CLINIC | Facility: CLINIC | Age: 80
End: 2020-10-06

## 2020-10-06 DIAGNOSIS — Z12.31 ENCOUNTER FOR SCREENING MAMMOGRAM FOR BREAST CANCER: Primary | ICD-10-CM

## 2020-10-06 NOTE — TELEPHONE ENCOUNTER
Pt is requesting to please put an order on the system for her mammogram, pt would like to make an appt. Please call pt as soonest order is ready.

## 2020-10-06 NOTE — TELEPHONE ENCOUNTER
Last mammogram 10/18/2019. Recommendations for routine mammogram and clinical evaluation in 12 months. Order pending for approval. Please advise.  TY

## 2020-10-19 ENCOUNTER — HOSPITAL ENCOUNTER (OUTPATIENT)
Dept: MAMMOGRAPHY | Age: 80
Discharge: HOME | End: 2020-10-19
Attending: INTERNAL MEDICINE
Payer: MEDICARE

## 2020-10-19 DIAGNOSIS — Z12.31 ENCOUNTER FOR SCREENING MAMMOGRAM FOR BREAST CANCER: ICD-10-CM

## 2020-10-19 PROCEDURE — 77067 SCR MAMMO BI INCL CAD: CPT | Performed by: INTERNAL MEDICINE

## 2020-10-19 PROCEDURE — 77063 BREAST TOMOSYNTHESIS BI: CPT | Performed by: INTERNAL MEDICINE

## 2020-10-26 RX ORDER — PRAVASTATIN SODIUM 20 MG
TABLET ORAL
Qty: 90 TABLET | Refills: 0 | Status: SHIPPED | OUTPATIENT
Start: 2020-10-26 | End: 2020-12-29

## 2020-10-26 NOTE — TELEPHONE ENCOUNTER
Passed protocol    Requesting PRAVASTATIN SODIUM 20 MG Oral Tab  LOV: 7/18/20  RTC: 6 months  Last Relevant Labs: 7/1/20  Filled: 8/8/20 #90 with 0 refills    No future appointments.

## 2020-11-13 DIAGNOSIS — I10 ESSENTIAL HYPERTENSION: ICD-10-CM

## 2020-11-16 RX ORDER — AMLODIPINE BESYLATE AND BENAZEPRIL HYDROCHLORIDE 5; 10 MG/1; MG/1
CAPSULE ORAL
Qty: 90 CAPSULE | Refills: 1 | Status: SHIPPED | OUTPATIENT
Start: 2020-11-16 | End: 2021-03-30

## 2020-11-16 NOTE — TELEPHONE ENCOUNTER
Medication(s) to Refill:   Requested Prescriptions     Pending Prescriptions Disp Refills   • AMLODIPINE BESY-BENAZEPRIL HCL 5-10 MG Oral Cap [Pharmacy Med Name: AMLODIPINE BESYLATE/BENAZEPRIL HYDROCHLORIDE 5-10 MG Capsule] 90 capsule 1     Sig: TAKE 1 CAP

## 2020-12-28 DIAGNOSIS — E78.00 PURE HYPERCHOLESTEROLEMIA: Primary | Chronic | ICD-10-CM

## 2020-12-29 RX ORDER — PRAVASTATIN SODIUM 20 MG
TABLET ORAL
Qty: 90 TABLET | Refills: 1 | Status: SHIPPED | OUTPATIENT
Start: 2020-12-29 | End: 2021-05-17

## 2020-12-29 NOTE — TELEPHONE ENCOUNTER
Passed protocol    Requesting PRAVASTATIN SODIUM 20 MG Oral Tab  LOV: 7/18/20  RTC: 6 months  Last Relevant Labs: 7/1/20  Filled: 10/26/20 #90 with 0 refills    No future appointments.

## 2021-03-05 DIAGNOSIS — Z23 NEED FOR VACCINATION: ICD-10-CM

## 2021-03-29 DIAGNOSIS — I10 ESSENTIAL HYPERTENSION: ICD-10-CM

## 2021-03-30 RX ORDER — AMLODIPINE BESYLATE AND BENAZEPRIL HYDROCHLORIDE 5; 10 MG/1; MG/1
CAPSULE ORAL
Qty: 90 CAPSULE | Refills: 1 | Status: SHIPPED | OUTPATIENT
Start: 2021-03-30 | End: 2021-08-19

## 2021-03-30 NOTE — TELEPHONE ENCOUNTER
LVMTCB   Pt due for med f/u     Protocol failed     Medication(s) to Refill:   Requested Prescriptions     Pending Prescriptions Disp Refills   • AMLODIPINE BESY-BENAZEPRIL HCL 5-10 MG Oral Cap [Pharmacy Med Name: AMLODIPINE BESYLATE/BENAZEPRIL HYDROCHLORI

## 2021-04-19 ENCOUNTER — TELEPHONE (OUTPATIENT)
Dept: INTERNAL MEDICINE CLINIC | Facility: CLINIC | Age: 81
End: 2021-04-19

## 2021-04-19 NOTE — TELEPHONE ENCOUNTER
Called patient twice and left a voicemail that their appointment for 4/23 is cancelled and they need to call back to reschedule.

## 2021-04-30 ENCOUNTER — OFFICE VISIT (OUTPATIENT)
Dept: INTERNAL MEDICINE CLINIC | Facility: CLINIC | Age: 81
End: 2021-04-30
Payer: MEDICARE

## 2021-04-30 VITALS
HEIGHT: 66 IN | BODY MASS INDEX: 24.31 KG/M2 | DIASTOLIC BLOOD PRESSURE: 56 MMHG | OXYGEN SATURATION: 98 % | HEART RATE: 74 BPM | TEMPERATURE: 98 F | RESPIRATION RATE: 16 BRPM | SYSTOLIC BLOOD PRESSURE: 110 MMHG | WEIGHT: 151.25 LBS

## 2021-04-30 DIAGNOSIS — Z00.00 ENCOUNTER FOR ANNUAL HEALTH EXAMINATION: Primary | ICD-10-CM

## 2021-04-30 DIAGNOSIS — N81.4 PROLAPSED UTERUS: ICD-10-CM

## 2021-04-30 DIAGNOSIS — I10 ESSENTIAL HYPERTENSION: ICD-10-CM

## 2021-04-30 DIAGNOSIS — Z85.828 HISTORY OF SKIN CANCER: ICD-10-CM

## 2021-04-30 DIAGNOSIS — H25.9 AGE-RELATED CATARACT OF BOTH EYES, UNSPECIFIED AGE-RELATED CATARACT TYPE: ICD-10-CM

## 2021-04-30 DIAGNOSIS — R15.9 INCONTINENCE OF FECES, UNSPECIFIED FECAL INCONTINENCE TYPE: ICD-10-CM

## 2021-04-30 DIAGNOSIS — M81.0 SENILE OSTEOPOROSIS: ICD-10-CM

## 2021-04-30 DIAGNOSIS — E78.00 PURE HYPERCHOLESTEROLEMIA: ICD-10-CM

## 2021-04-30 PROCEDURE — 3078F DIAST BP <80 MM HG: CPT | Performed by: NURSE PRACTITIONER

## 2021-04-30 PROCEDURE — G0439 PPPS, SUBSEQ VISIT: HCPCS | Performed by: NURSE PRACTITIONER

## 2021-04-30 PROCEDURE — 96160 PT-FOCUSED HLTH RISK ASSMT: CPT | Performed by: NURSE PRACTITIONER

## 2021-04-30 PROCEDURE — 99397 PER PM REEVAL EST PAT 65+ YR: CPT | Performed by: NURSE PRACTITIONER

## 2021-04-30 PROCEDURE — 3074F SYST BP LT 130 MM HG: CPT | Performed by: NURSE PRACTITIONER

## 2021-04-30 PROCEDURE — 3008F BODY MASS INDEX DOCD: CPT | Performed by: NURSE PRACTITIONER

## 2021-04-30 RX ORDER — CHLORAL HYDRATE 500 MG
1000 CAPSULE ORAL
COMMUNITY
End: 2021-04-30

## 2021-04-30 NOTE — PROGRESS NOTES
HPI:   Tracy Dorado is a 79year old female who presents for a MA (Medicare Advantage) Supervisit (Once per calendar year).       Annual Physical due on 04/30/2022        Fall/Risk Assessment   She has been screened for Falls and is low risk: Fall/Risk Sc (68.6 kg)  07/18/20 : 154 lb (69.9 kg)  06/26/20 : 156 lb 8 oz (71 kg)     Last Cholesterol Labs:   Lab Results   Component Value Date    CHOLEST 166 07/01/2020    HDL 41 07/01/2020    LDL 90 07/01/2020    TRIG 175 (H) 07/01/2020          Last Chemistry La alcohol use of about 1.0 standard drinks of alcohol per week. She reports that she does not use drugs.      REVIEW OF SYSTEMS:   GENERAL: feels well otherwise, no change in appetite  SKIN: denies any unusual skin lesions  EYES: denies blurred vision or doub enlarged, symmetric, no tenderness/mass/nodules; no carotid bruit or JVD   Back:   Symmetric, no curvature, ROM normal, no CVA tenderness   Lungs:   Clear to auscultation bilaterally, respirations unlabored   Heart:  Regular rate and rhythm, S1 and S2 norm activity.      Pure hypercholesterolemia  Controlled on statin, labs due    Essential hypertension  Controlled on current regimen, CPM    Prolapsed uterus  Asymptomatic other than possibly associated fecal incontinence  Has seen same gynecology group [facility] fo (mg/dL (calc))   Date Value   03/08/2018 141 (H)        EKG - w/ Initial Preventative Physical Exam only, or if medically necessary Electrocardiogram date06/26/2020       Colorectal Cancer Screening      Colonoscopy Screen every 10 years There are no preve Homosexual men   Illicit injectable drug abusers     Tetanus Toxoid  Only covered with a cut with metal- TD and TDaP Not covered by Medicare Part B No vaccine history found This may be covered with your prescription benefits, but Medicare does not cover

## 2021-04-30 NOTE — PATIENT INSTRUCTIONS
Radha Sifuentes's SCREENING SCHEDULE   Tests on this list are recommended by your physician but may not be covered, or covered at this frequency, by your insurer. Please check with your insurance carrier before scheduling to verify coverage.    PREVENTATIVE this or any previous visit.  Limited to patients who meet one of the following criteria:   • Men who are 73-68 years old and have smoked more than 100 cigarettes in their lifetime   • Anyone with a family history    Colorectal Cancer Screening  Covered up t preventive care reminders to display for this patient. Please get this Mammogram regularly   Immunizations      Influenza  Covered Annually No orders found for this or any previous visit.  Please get every year    Pneumococcal 13 (Prevnar)  Covered Once aft computer and printer. (the forms are also available in [address])  [email or web address]  This link also has information from the [facility] regarding Advance Directives.

## 2021-05-17 DIAGNOSIS — E78.00 PURE HYPERCHOLESTEROLEMIA: Chronic | ICD-10-CM

## 2021-05-17 RX ORDER — PRAVASTATIN SODIUM 20 MG
TABLET ORAL
Qty: 90 TABLET | Refills: 1 | Status: SHIPPED | OUTPATIENT
Start: 2021-05-17 | End: 2021-10-04

## 2021-06-14 ENCOUNTER — LAB ENCOUNTER (OUTPATIENT)
Dept: LAB | Age: 81
End: 2021-06-14
Attending: NURSE PRACTITIONER
Payer: MEDICARE

## 2021-06-14 PROCEDURE — 80053 COMPREHEN METABOLIC PANEL: CPT | Performed by: NURSE PRACTITIONER

## 2021-06-14 PROCEDURE — 81001 URINALYSIS AUTO W/SCOPE: CPT | Performed by: NURSE PRACTITIONER

## 2021-06-14 PROCEDURE — 83036 HEMOGLOBIN GLYCOSYLATED A1C: CPT | Performed by: NURSE PRACTITIONER

## 2021-06-14 PROCEDURE — 82306 VITAMIN D 25 HYDROXY: CPT | Performed by: NURSE PRACTITIONER

## 2021-06-14 PROCEDURE — 84443 ASSAY THYROID STIM HORMONE: CPT | Performed by: NURSE PRACTITIONER

## 2021-06-14 PROCEDURE — 80061 LIPID PANEL: CPT | Performed by: NURSE PRACTITIONER

## 2021-06-14 PROCEDURE — 84439 ASSAY OF FREE THYROXINE: CPT | Performed by: NURSE PRACTITIONER

## 2021-06-14 PROCEDURE — 36415 COLL VENOUS BLD VENIPUNCTURE: CPT | Performed by: NURSE PRACTITIONER

## 2021-06-14 PROCEDURE — 85025 COMPLETE CBC W/AUTO DIFF WBC: CPT | Performed by: NURSE PRACTITIONER

## 2021-06-15 ENCOUNTER — TELEPHONE (OUTPATIENT)
Dept: INTERNAL MEDICINE CLINIC | Facility: CLINIC | Age: 81
End: 2021-06-15

## 2021-06-15 NOTE — TELEPHONE ENCOUNTER
Patient calling returning call to [facility] for test results, Patient stated she is leaving in 10 minutes so if it is after that, she will call tomorrow.

## 2021-06-24 ENCOUNTER — TELEPHONE (OUTPATIENT)
Dept: INTERNAL MEDICINE CLINIC | Facility: CLINIC | Age: 81
End: 2021-06-24

## 2021-06-24 DIAGNOSIS — N81.4 PROLAPSED UTERUS: Primary | ICD-10-CM

## 2021-06-24 NOTE — TELEPHONE ENCOUNTER
Pt is requesting referral to be placed for Dr Vin Vasquez for a prolapsed uterus.      Dr Bird Marcano   [phone]    Pt has appointment on 7/19 with Dr Vin Vasquez     Confirmed [phone] as best contact for pt

## 2021-06-28 NOTE — TELEPHONE ENCOUNTER
Referral for Dr. Conor Simmons (for prolapsed uterus) has been authorized. Left message for pt to call back to inform.

## 2021-08-13 ENCOUNTER — OFFICE VISIT (OUTPATIENT)
Dept: INTERNAL MEDICINE CLINIC | Facility: CLINIC | Age: 81
End: 2021-08-13
Payer: MEDICARE

## 2021-08-13 VITALS
HEART RATE: 76 BPM | SYSTOLIC BLOOD PRESSURE: 124 MMHG | WEIGHT: 145 LBS | RESPIRATION RATE: 16 BRPM | TEMPERATURE: 98 F | DIASTOLIC BLOOD PRESSURE: 62 MMHG | HEIGHT: 66 IN | BODY MASS INDEX: 23.3 KG/M2

## 2021-08-13 DIAGNOSIS — R10.9 LEFT FLANK PAIN: Primary | ICD-10-CM

## 2021-08-13 DIAGNOSIS — R31.29 OTHER MICROSCOPIC HEMATURIA: ICD-10-CM

## 2021-08-13 LAB
APPEARANCE: CLEAR
BILIRUBIN: NEGATIVE
GLUCOSE (URINE DIPSTICK): NEGATIVE MG/DL
KETONES (URINE DIPSTICK): NEGATIVE MG/DL
MULTISTIX EXPIRATION DATE: ABNORMAL DATE
MULTISTIX LOT#: 5077 NUMERIC
NITRITE, URINE: NEGATIVE
PH, URINE: 6 (ref 4.5–8)
PROTEIN (URINE DIPSTICK): NEGATIVE MG/DL
SPECIFIC GRAVITY: 1.02 (ref 1–1.03)
URINE-COLOR: YELLOW
UROBILINOGEN,SEMI-QN: 0.2 MG/DL (ref 0–1.9)

## 2021-08-13 PROCEDURE — 99213 OFFICE O/P EST LOW 20 MIN: CPT | Performed by: NURSE PRACTITIONER

## 2021-08-13 PROCEDURE — 3078F DIAST BP <80 MM HG: CPT | Performed by: NURSE PRACTITIONER

## 2021-08-13 PROCEDURE — 87086 URINE CULTURE/COLONY COUNT: CPT | Performed by: NURSE PRACTITIONER

## 2021-08-13 PROCEDURE — 3008F BODY MASS INDEX DOCD: CPT | Performed by: NURSE PRACTITIONER

## 2021-08-13 PROCEDURE — 3074F SYST BP LT 130 MM HG: CPT | Performed by: NURSE PRACTITIONER

## 2021-08-13 PROCEDURE — 81003 URINALYSIS AUTO W/O SCOPE: CPT | Performed by: NURSE PRACTITIONER

## 2021-08-13 NOTE — PROGRESS NOTES
CHIEF COMPLAINT:   Patient presents with:  UTI: has been a couple weeks. Pt is having yellow urine and lower L back pain. Denies burning with urination      HPI:   Maria Luisa Matias is a 79year old female who presents with symptoms of UTI.  Complaining of luisito HPI.  NEURO: no headaches.     EXAM:   /62 (BP Location: Right arm, Patient Position: Sitting, Cuff Size: adult)   Pulse 76   Temp 98.2 °F (36.8 °C) (Oral)   Resp 16   Ht 5' 6\" (1.676 m)   Wt 145 lb (65.8 kg)   BMI 23.40 kg/m²   GENERAL: well develop bladder emptying after intercourse. The patient indicates understanding of these issues and agrees to the plan.

## 2021-08-16 ENCOUNTER — TELEPHONE (OUTPATIENT)
Dept: INTERNAL MEDICINE CLINIC | Facility: CLINIC | Age: 81
End: 2021-08-16

## 2021-08-17 ENCOUNTER — TELEPHONE (OUTPATIENT)
Dept: INTERNAL MEDICINE CLINIC | Facility: CLINIC | Age: 81
End: 2021-08-17

## 2021-08-17 DIAGNOSIS — R31.29 OTHER MICROSCOPIC HEMATURIA: ICD-10-CM

## 2021-08-17 DIAGNOSIS — R10.9 LEFT FLANK PAIN: Primary | ICD-10-CM

## 2021-08-17 NOTE — TELEPHONE ENCOUNTER
I spoke with Zoila Dean from radiology. DX code is not incorrect, but the actual CT was ordered incorrectly if you are looking for renal calculi. If you are looking for renal calculi, correct CT is pending.

## 2021-08-17 NOTE — TELEPHONE ENCOUNTER
Nury Huang from [facility] radiology called and stated that according to the diagnosis notes from 8/13, they do not match the type of order that was placed for the CT w/out contrast. Nury Huang states that it appears that the order was placed to look for a kidney stone

## 2021-08-18 DIAGNOSIS — I10 ESSENTIAL HYPERTENSION: ICD-10-CM

## 2021-08-19 RX ORDER — AMLODIPINE BESYLATE AND BENAZEPRIL HYDROCHLORIDE 5; 10 MG/1; MG/1
CAPSULE ORAL
Qty: 90 CAPSULE | Refills: 0 | Status: SHIPPED | OUTPATIENT
Start: 2021-08-19 | End: 2021-12-02

## 2021-08-19 NOTE — TELEPHONE ENCOUNTER
Name from pharmacy: AMLODIPINE BESYLATE/BENAZEPRIL HYDROCHLORIDE 5-10 MG Capsule          Will file in chart as: AMLODIPINE BESY-BENAZEPRIL HCL 5-10 MG Oral Cap    Sig: TAKE 1 CAPSULE EVERY DAY    Disp:  90 capsule    Refills:  1 (Pharmacy requested: Not s

## 2021-08-23 ENCOUNTER — HOSPITAL ENCOUNTER (OUTPATIENT)
Dept: CT IMAGING | Age: 81
Discharge: HOME | End: 2021-08-23
Attending: NURSE PRACTITIONER
Payer: MEDICARE

## 2021-08-23 DIAGNOSIS — R10.9 LEFT FLANK PAIN: ICD-10-CM

## 2021-08-23 DIAGNOSIS — R31.29 OTHER MICROSCOPIC HEMATURIA: ICD-10-CM

## 2021-08-23 PROCEDURE — 74176 CT ABD & PELVIS W/O CONTRAST: CPT | Performed by: NURSE PRACTITIONER

## 2021-08-24 ENCOUNTER — TELEPHONE (OUTPATIENT)
Dept: INTERNAL MEDICINE CLINIC | Facility: CLINIC | Age: 81
End: 2021-08-24

## 2021-08-24 DIAGNOSIS — R31.29 OTHER MICROSCOPIC HEMATURIA: Primary | ICD-10-CM

## 2021-10-01 ENCOUNTER — OFFICE VISIT (OUTPATIENT)
Dept: SURGERY | Facility: CLINIC | Age: 81
End: 2021-10-01
Payer: MEDICARE

## 2021-10-01 DIAGNOSIS — E78.00 PURE HYPERCHOLESTEROLEMIA: Chronic | ICD-10-CM

## 2021-10-01 DIAGNOSIS — R82.90 URINE FINDING: Primary | ICD-10-CM

## 2021-10-01 DIAGNOSIS — R31.29 MICROSCOPIC HEMATURIA: ICD-10-CM

## 2021-10-01 PROCEDURE — 81003 URINALYSIS AUTO W/O SCOPE: CPT | Performed by: UROLOGY

## 2021-10-01 PROCEDURE — 99203 OFFICE O/P NEW LOW 30 MIN: CPT | Performed by: UROLOGY

## 2021-10-01 RX ORDER — ASPIRIN 81 MG/1
81 TABLET ORAL EVERY OTHER DAY
COMMUNITY

## 2021-10-01 NOTE — PROGRESS NOTES
Rooming Clinician:     Judy Martins is a 79year old female.   Patient presents with:  Consult: referred by pcp for micro hematuria  Miscellaneous Urology:  Chief Complaint: Patient presents with:  Consult: referred by pcp for micro hematuria    Date of O Patient comes for evaluation of asymptomatic microscopic hematuria. Patient has no voiding complaints.   She does have a history of prolapse of the uterus and is currently being evaluated by the gynecologist.  She has no prior history of injury or surg Tobacco comment: >40 years    Vaping Use      Vaping Use: Never used    Alcohol use:  Yes      Alcohol/week: 1.0 standard drink      Types: 1 Glasses of wine per week    Drug use: No       REVIEW OF SYSTEMS:     GENERAL HEALTH: feels well otherwise  SKIN

## 2021-10-01 NOTE — PATIENT INSTRUCTIONS
Blood in the Urine    Blood in the urine (hematuria) has many possible causes. If it occurs after an injury (such as a car accident or fall), it is most often a sign of bruising to the kidney or bladder.  Common causes of blood in the urine include urinar or fainting  · New groin, belly, or back pain  · Fever of 100.4ºF (38ºC) or higher, or as directed by your provider  · Repeated vomiting  · Bleeding from the nose or gums or easy bruising  Mahesh last reviewed this educational content on 9/1/2019 © 2000 gland infection (prostatitis)  · Taking anticoagulants  · Blockage in the urinary tract  · Kidney disease or inflammation  · Cystic diseases of the kidneys  · Sickle cell anemia  · Vigorous exercise  · Endometriosis  Mahesh last reviewed this educational provider’s office, surgery center, or hospital. The doctor and a nurse are present during the procedure. It takes only a few minutes. It takes longer if a biopsy, X-ray, or treatment needs to be done.    During the procedure:  · You lie on an exam table on

## 2021-10-04 ENCOUNTER — TELEPHONE (OUTPATIENT)
Dept: SURGERY | Facility: CLINIC | Age: 81
End: 2021-10-04

## 2021-10-04 DIAGNOSIS — R31.29 MICROSCOPIC HEMATURIA: Primary | ICD-10-CM

## 2021-10-04 RX ORDER — PRAVASTATIN SODIUM 20 MG
TABLET ORAL
Qty: 90 TABLET | Refills: 1 | Status: SHIPPED | OUTPATIENT
Start: 2021-10-04

## 2021-10-04 NOTE — TELEPHONE ENCOUNTER
This RN called patient in response to her call:     \"Pt called stating pt is scheduled for an appointment 10-7-21 cysto. Pt is checking if authorization is needed. Can pt have the code.   Please call \"    Informed patient that PA not initiated at this t

## 2021-10-04 NOTE — TELEPHONE ENCOUNTER
Medication(s) to Refill:   Requested Prescriptions     Pending Prescriptions Disp Refills   • PRAVASTATIN 20 MG Oral Tab [Pharmacy Med Name: PRAVASTATIN SODIUM 20 MG Tablet] 90 tablet 1     Sig: TAKE 1 TABLET EVERY NIGHT       LOV:  4/30/2021    RTC:  None

## 2021-10-04 NOTE — TELEPHONE ENCOUNTER
Pt called stating pt is scheduled for an appointment 10-7-21 cysto. Pt is checking if authorization is needed. Can pt have the code.   Please call

## 2021-10-04 NOTE — TELEPHONE ENCOUNTER
Patient is scheduled for in office cystoscopy CPT 18674 on Thursday, October 7, 2021. Diagnosis code: R31.29. [facility]: [id number].

## 2021-10-07 ENCOUNTER — PROCEDURE (OUTPATIENT)
Dept: SURGERY | Facility: CLINIC | Age: 81
End: 2021-10-07
Payer: MEDICARE

## 2021-10-07 VITALS — SYSTOLIC BLOOD PRESSURE: 124 MMHG | DIASTOLIC BLOOD PRESSURE: 76 MMHG | HEART RATE: 65 BPM

## 2021-10-07 DIAGNOSIS — R31.29 MICROSCOPIC HEMATURIA: ICD-10-CM

## 2021-10-07 DIAGNOSIS — R82.90 URINE FINDING: Primary | ICD-10-CM

## 2021-10-07 PROCEDURE — 52000 CYSTOURETHROSCOPY: CPT | Performed by: UROLOGY

## 2021-10-07 PROCEDURE — 3074F SYST BP LT 130 MM HG: CPT | Performed by: UROLOGY

## 2021-10-07 PROCEDURE — 81003 URINALYSIS AUTO W/O SCOPE: CPT | Performed by: UROLOGY

## 2021-10-07 PROCEDURE — 3078F DIAST BP <80 MM HG: CPT | Performed by: UROLOGY

## 2021-10-07 RX ORDER — CIPROFLOXACIN 500 MG/1
500 TABLET, FILM COATED ORAL ONCE
Status: SHIPPED | OUTPATIENT
Start: 2021-10-07

## 2021-10-07 NOTE — PROGRESS NOTES
Rooming Clinician:     Judy Martins is a 79year old female. Patient presents with:  Procedure: cystoscopy        HPI:     Patient is a history of asymptomatic dipstick microscopic hematuria. She has no voiding complaint.   CT abdomen and pelvis shows n GENERAL HEALTH: feels well otherwise  SKIN: denies any unusual skin lesions or rashes  RESPIRATORY: denies shortness of breath with exertion  CARDIOVASCULAR: denies chest pain on exertion  GI: denies abdominal pain and denies heartburn  : see HPI  NE CREATSERUM 0.75 06/14/2021    BUN 10 06/14/2021     06/14/2021    K 4.2 06/14/2021     06/14/2021    CO2 27.0 06/14/2021    GLU 92 06/14/2021    CA 8.8 06/14/2021    ALB 3.8 06/14/2021    ALKPHO 80 06/14/2021    AST 22 06/14/2021    ALT 22 06/1

## 2021-10-15 ENCOUNTER — TELEPHONE (OUTPATIENT)
Dept: INTERNAL MEDICINE CLINIC | Facility: CLINIC | Age: 81
End: 2021-10-15

## 2021-10-15 DIAGNOSIS — Z12.31 ENCOUNTER FOR SCREENING MAMMOGRAM FOR MALIGNANT NEOPLASM OF BREAST: Primary | ICD-10-CM

## 2021-10-15 NOTE — TELEPHONE ENCOUNTER
Mammogram results from 10/19/20. Impression   CONCLUSION:     DIAGNOSTIC CATEGORY 2--BENIGN FINDING NO CHANGE FROM COMPARISON ASSESSMENT.         RECOMMENDATIONS:     ROUTINE MAMMOGRAM AND CLINICAL EVALUATION IN 12 MONTHS.       Orders entered and pt give

## 2021-10-29 ENCOUNTER — HOSPITAL ENCOUNTER (OUTPATIENT)
Dept: MAMMOGRAPHY | Age: 81
Discharge: HOME | End: 2021-10-29
Attending: INTERNAL MEDICINE
Payer: MEDICARE

## 2021-10-29 DIAGNOSIS — Z12.31 ENCOUNTER FOR SCREENING MAMMOGRAM FOR MALIGNANT NEOPLASM OF BREAST: ICD-10-CM

## 2021-10-29 PROCEDURE — 77067 SCR MAMMO BI INCL CAD: CPT | Performed by: INTERNAL MEDICINE

## 2021-10-29 PROCEDURE — 77063 BREAST TOMOSYNTHESIS BI: CPT | Performed by: INTERNAL MEDICINE

## 2021-11-08 ENCOUNTER — HOSPITAL ENCOUNTER (OUTPATIENT)
Dept: MAMMOGRAPHY | Facility: HOSPITAL | Age: 81
Discharge: HOME | End: 2021-11-08
Attending: INTERNAL MEDICINE
Payer: MEDICARE

## 2021-11-08 DIAGNOSIS — R92.2 INCONCLUSIVE MAMMOGRAM: ICD-10-CM

## 2021-11-08 PROCEDURE — 77065 DX MAMMO INCL CAD UNI: CPT | Performed by: INTERNAL MEDICINE

## 2021-11-08 PROCEDURE — 76642 ULTRASOUND BREAST LIMITED: CPT | Performed by: INTERNAL MEDICINE

## 2021-11-08 PROCEDURE — 77061 BREAST TOMOSYNTHESIS UNI: CPT | Performed by: INTERNAL MEDICINE

## 2021-12-01 DIAGNOSIS — I10 ESSENTIAL HYPERTENSION: ICD-10-CM

## 2021-12-02 RX ORDER — AMLODIPINE BESYLATE AND BENAZEPRIL HYDROCHLORIDE 5; 10 MG/1; MG/1
CAPSULE ORAL
Qty: 90 CAPSULE | Refills: 0 | Status: SHIPPED | OUTPATIENT
Start: 2021-12-02 | End: 2022-02-07

## 2022-02-07 RX ORDER — AMLODIPINE BESYLATE AND BENAZEPRIL HYDROCHLORIDE 5; 10 MG/1; MG/1
CAPSULE ORAL
Qty: 90 CAPSULE | Refills: 0 | Status: SHIPPED | OUTPATIENT
Start: 2022-02-07

## 2022-02-23 ENCOUNTER — TELEPHONE (OUTPATIENT)
Dept: INTERNAL MEDICINE CLINIC | Facility: CLINIC | Age: 82
End: 2022-02-23

## 2022-02-25 RX ORDER — PRAVASTATIN SODIUM 20 MG
TABLET ORAL
Qty: 90 TABLET | Refills: 1 | OUTPATIENT
Start: 2022-02-25

## 2022-03-21 ENCOUNTER — OFFICE VISIT (OUTPATIENT)
Dept: INTERNAL MEDICINE CLINIC | Facility: CLINIC | Age: 82
End: 2022-03-21
Payer: MEDICARE

## 2022-03-21 VITALS
RESPIRATION RATE: 14 BRPM | HEART RATE: 76 BPM | TEMPERATURE: 98 F | BODY MASS INDEX: 23.14 KG/M2 | WEIGHT: 144 LBS | HEIGHT: 66 IN | DIASTOLIC BLOOD PRESSURE: 60 MMHG | OXYGEN SATURATION: 96 % | SYSTOLIC BLOOD PRESSURE: 100 MMHG

## 2022-03-21 DIAGNOSIS — M81.0 SENILE OSTEOPOROSIS: Chronic | ICD-10-CM

## 2022-03-21 DIAGNOSIS — I10 ESSENTIAL HYPERTENSION: Chronic | ICD-10-CM

## 2022-03-21 DIAGNOSIS — E78.00 PURE HYPERCHOLESTEROLEMIA: Chronic | ICD-10-CM

## 2022-03-21 DIAGNOSIS — N81.4 PROLAPSED UTERUS: ICD-10-CM

## 2022-03-21 DIAGNOSIS — R15.9 INCONTINENCE OF FECES, UNSPECIFIED FECAL INCONTINENCE TYPE: ICD-10-CM

## 2022-03-21 DIAGNOSIS — Z00.00 ROUTINE GENERAL MEDICAL EXAMINATION AT A HEALTH CARE FACILITY: Primary | ICD-10-CM

## 2022-03-21 DIAGNOSIS — C44.90 SKIN CANCER: Chronic | ICD-10-CM

## 2022-03-21 PROBLEM — H25.9 AGE-RELATED CATARACT OF BOTH EYES: Status: RESOLVED | Noted: 2021-04-30 | Resolved: 2022-03-21

## 2022-03-21 PROCEDURE — G0439 PPPS, SUBSEQ VISIT: HCPCS | Performed by: INTERNAL MEDICINE

## 2022-03-21 PROCEDURE — 3078F DIAST BP <80 MM HG: CPT | Performed by: INTERNAL MEDICINE

## 2022-03-21 PROCEDURE — 96160 PT-FOCUSED HLTH RISK ASSMT: CPT | Performed by: INTERNAL MEDICINE

## 2022-03-21 PROCEDURE — 3008F BODY MASS INDEX DOCD: CPT | Performed by: INTERNAL MEDICINE

## 2022-03-21 PROCEDURE — 3074F SYST BP LT 130 MM HG: CPT | Performed by: INTERNAL MEDICINE

## 2022-03-21 PROCEDURE — 93000 ELECTROCARDIOGRAM COMPLETE: CPT | Performed by: INTERNAL MEDICINE

## 2022-03-21 PROCEDURE — 99397 PER PM REEVAL EST PAT 65+ YR: CPT | Performed by: INTERNAL MEDICINE

## 2022-03-24 RX ORDER — PRAVASTATIN SODIUM 20 MG
TABLET ORAL
Qty: 90 TABLET | Refills: 0 | Status: SHIPPED | OUTPATIENT
Start: 2022-03-24

## 2022-03-24 NOTE — TELEPHONE ENCOUNTER
Protocol passed     Requesting: pravastatin 20mg     LOV: 3/21/22   RTC: 6 months   Filled: 10/4/21 #90 1 refill   Recent Labs: 6/14/21     Upcoming OV: none scheduled

## 2022-03-29 PROBLEM — N81.4 PROLAPSED UTERUS: Chronic | Status: ACTIVE | Noted: 2021-04-30

## 2022-03-29 PROBLEM — R15.9 INCONTINENCE OF FECES: Chronic | Status: ACTIVE | Noted: 2021-04-30

## 2022-03-29 PROBLEM — Z85.828 HISTORY OF SKIN CANCER: Status: ACTIVE | Noted: 2022-03-29

## 2022-03-29 PROBLEM — Z85.828 HISTORY OF SKIN CANCER: Chronic | Status: ACTIVE | Noted: 2022-03-29

## 2022-04-11 ENCOUNTER — LAB ENCOUNTER (OUTPATIENT)
Dept: LAB | Age: 82
End: 2022-04-11
Attending: INTERNAL MEDICINE
Payer: MEDICARE

## 2022-04-11 DIAGNOSIS — Z00.00 ROUTINE GENERAL MEDICAL EXAMINATION AT A HEALTH CARE FACILITY: Primary | ICD-10-CM

## 2022-04-11 LAB
ALBUMIN SERPL-MCNC: 3.6 G/DL (ref 3.4–5)
ALBUMIN/GLOB SERPL: 1.5 {RATIO} (ref 1–2)
ALP LIVER SERPL-CCNC: 87 U/L
ALT SERPL-CCNC: 28 U/L
ANION GAP SERPL CALC-SCNC: 6 MMOL/L (ref 0–18)
AST SERPL-CCNC: 23 U/L (ref 15–37)
BASOPHILS # BLD AUTO: 0.05 X10(3) UL (ref 0–0.2)
BASOPHILS NFR BLD AUTO: 0.8 %
BILIRUB SERPL-MCNC: 0.4 MG/DL (ref 0.1–2)
BILIRUB UR QL STRIP.AUTO: NEGATIVE
BUN BLD-MCNC: 9 MG/DL (ref 7–18)
CALCIUM BLD-MCNC: 8.9 MG/DL (ref 8.5–10.1)
CHLORIDE SERPL-SCNC: 103 MMOL/L (ref 98–112)
CHOLEST SERPL-MCNC: 161 MG/DL (ref ?–200)
CLARITY UR REFRACT.AUTO: CLEAR
CO2 SERPL-SCNC: 28 MMOL/L (ref 21–32)
CREAT BLD-MCNC: 0.66 MG/DL
EOSINOPHIL # BLD AUTO: 0.3 X10(3) UL (ref 0–0.7)
EOSINOPHIL NFR BLD AUTO: 4.8 %
ERYTHROCYTE [DISTWIDTH] IN BLOOD BY AUTOMATED COUNT: 12 %
EST. AVERAGE GLUCOSE BLD GHB EST-MCNC: 120 MG/DL (ref 68–126)
FASTING PATIENT LIPID ANSWER: YES
FASTING STATUS PATIENT QL REPORTED: YES
GLOBULIN PLAS-MCNC: 2.4 G/DL (ref 2.8–4.4)
GLUCOSE BLD-MCNC: 95 MG/DL (ref 70–99)
GLUCOSE UR STRIP.AUTO-MCNC: NEGATIVE MG/DL
HBA1C MFR BLD: 5.8 % (ref ?–5.7)
HCT VFR BLD AUTO: 40.7 %
HDLC SERPL-MCNC: 51 MG/DL (ref 40–59)
HGB BLD-MCNC: 13.3 G/DL
IMM GRANULOCYTES # BLD AUTO: 0.03 X10(3) UL (ref 0–1)
IMM GRANULOCYTES NFR BLD: 0.5 %
KETONES UR STRIP.AUTO-MCNC: NEGATIVE MG/DL
LDLC SERPL CALC-MCNC: 87 MG/DL (ref ?–100)
LYMPHOCYTES # BLD AUTO: 1.37 X10(3) UL (ref 1–4)
LYMPHOCYTES NFR BLD AUTO: 22.1 %
MCH RBC QN AUTO: 32 PG (ref 26–34)
MCHC RBC AUTO-ENTMCNC: 32.7 G/DL (ref 31–37)
MCV RBC AUTO: 98.1 FL
MONOCYTES # BLD AUTO: 0.95 X10(3) UL (ref 0.1–1)
MONOCYTES NFR BLD AUTO: 15.3 %
NEUTROPHILS # BLD AUTO: 3.49 X10 (3) UL (ref 1.5–7.7)
NEUTROPHILS # BLD AUTO: 3.49 X10(3) UL (ref 1.5–7.7)
NEUTROPHILS NFR BLD AUTO: 56.5 %
NITRITE UR QL STRIP.AUTO: NEGATIVE
NONHDLC SERPL-MCNC: 110 MG/DL (ref ?–130)
OSMOLALITY SERPL CALC.SUM OF ELEC: 282 MOSM/KG (ref 275–295)
PH UR STRIP.AUTO: 7 [PH] (ref 5–8)
PLATELET # BLD AUTO: 193 10(3)UL (ref 150–450)
POTASSIUM SERPL-SCNC: 4.2 MMOL/L (ref 3.5–5.1)
PROT SERPL-MCNC: 6 G/DL (ref 6.4–8.2)
PROT UR STRIP.AUTO-MCNC: NEGATIVE MG/DL
RBC # BLD AUTO: 4.15 X10(6)UL
SODIUM SERPL-SCNC: 137 MMOL/L (ref 136–145)
SP GR UR STRIP.AUTO: 1 (ref 1–1.03)
T4 SERPL-MCNC: 9 UG/DL
TRIGL SERPL-MCNC: 131 MG/DL (ref 30–149)
TSI SER-ACNC: 3.2 MIU/ML (ref 0.36–3.74)
UROBILINOGEN UR STRIP.AUTO-MCNC: <2 MG/DL
VLDLC SERPL CALC-MCNC: 21 MG/DL (ref 0–30)
WBC # BLD AUTO: 6.2 X10(3) UL (ref 4–11)

## 2022-04-11 PROCEDURE — 84443 ASSAY THYROID STIM HORMONE: CPT | Performed by: INTERNAL MEDICINE

## 2022-04-11 PROCEDURE — 83036 HEMOGLOBIN GLYCOSYLATED A1C: CPT | Performed by: INTERNAL MEDICINE

## 2022-04-11 PROCEDURE — 84436 ASSAY OF TOTAL THYROXINE: CPT | Performed by: INTERNAL MEDICINE

## 2022-04-11 PROCEDURE — 80053 COMPREHEN METABOLIC PANEL: CPT | Performed by: INTERNAL MEDICINE

## 2022-04-11 PROCEDURE — 36415 COLL VENOUS BLD VENIPUNCTURE: CPT

## 2022-04-11 PROCEDURE — 80061 LIPID PANEL: CPT

## 2022-04-11 PROCEDURE — 81001 URINALYSIS AUTO W/SCOPE: CPT | Performed by: INTERNAL MEDICINE

## 2022-04-11 PROCEDURE — 85025 COMPLETE CBC W/AUTO DIFF WBC: CPT | Performed by: INTERNAL MEDICINE

## 2022-04-14 RX ORDER — AMLODIPINE BESYLATE AND BENAZEPRIL HYDROCHLORIDE 5; 10 MG/1; MG/1
CAPSULE ORAL
Qty: 90 CAPSULE | Refills: 0 | Status: SHIPPED | OUTPATIENT
Start: 2022-04-14

## 2022-04-14 NOTE — TELEPHONE ENCOUNTER
Protocol passed     Requesting: amlodipine besylate-benazepril 5-10mg     LOV: 3/21/22    RTC: 6 months   Filled: 2/7/22 #90 0 refills   Recent Labs: 4/11/22   Upcoming OV: none scheduled

## 2022-05-28 DIAGNOSIS — E78.00 PURE HYPERCHOLESTEROLEMIA: Chronic | ICD-10-CM

## 2022-05-31 RX ORDER — PRAVASTATIN SODIUM 20 MG
TABLET ORAL
Qty: 90 TABLET | Refills: 0 | Status: SHIPPED | OUTPATIENT
Start: 2022-05-31

## 2022-06-14 ENCOUNTER — OFFICE VISIT (OUTPATIENT)
Dept: INTERNAL MEDICINE CLINIC | Facility: CLINIC | Age: 82
End: 2022-06-14
Payer: MEDICARE

## 2022-06-14 ENCOUNTER — HOSPITAL ENCOUNTER (OUTPATIENT)
Dept: GENERAL RADIOLOGY | Age: 82
Discharge: HOME | End: 2022-06-14
Attending: INTERNAL MEDICINE
Payer: MEDICARE

## 2022-06-14 VITALS
HEIGHT: 66 IN | WEIGHT: 144.19 LBS | HEART RATE: 72 BPM | SYSTOLIC BLOOD PRESSURE: 120 MMHG | TEMPERATURE: 98 F | OXYGEN SATURATION: 97 % | RESPIRATION RATE: 12 BRPM | DIASTOLIC BLOOD PRESSURE: 60 MMHG | BODY MASS INDEX: 23.17 KG/M2

## 2022-06-14 DIAGNOSIS — E78.00 PURE HYPERCHOLESTEROLEMIA: Chronic | ICD-10-CM

## 2022-06-14 DIAGNOSIS — I10 ESSENTIAL HYPERTENSION: Chronic | ICD-10-CM

## 2022-06-14 DIAGNOSIS — M25.512 ACUTE PAIN OF LEFT SHOULDER: Primary | ICD-10-CM

## 2022-06-14 DIAGNOSIS — M25.512 ACUTE PAIN OF LEFT SHOULDER: ICD-10-CM

## 2022-06-14 PROCEDURE — 3078F DIAST BP <80 MM HG: CPT | Performed by: INTERNAL MEDICINE

## 2022-06-14 PROCEDURE — 73030 X-RAY EXAM OF SHOULDER: CPT | Performed by: INTERNAL MEDICINE

## 2022-06-14 PROCEDURE — 3008F BODY MASS INDEX DOCD: CPT | Performed by: INTERNAL MEDICINE

## 2022-06-14 PROCEDURE — 99214 OFFICE O/P EST MOD 30 MIN: CPT | Performed by: INTERNAL MEDICINE

## 2022-06-14 PROCEDURE — 3074F SYST BP LT 130 MM HG: CPT | Performed by: INTERNAL MEDICINE

## 2022-06-14 RX ORDER — IBUPROFEN 400 MG/1
400 TABLET ORAL 2 TIMES DAILY PRN
Qty: 20 TABLET | Refills: 0 | Status: SHIPPED | OUTPATIENT
Start: 2022-06-14

## 2022-06-14 NOTE — PROGRESS NOTES
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 She should start the ibuprofen and physical therapy. If no improvement with it she can follow up for a steroid injection with the orthopedic surgeon.  [facility] DO

## 2022-06-14 NOTE — PATIENT INSTRUCTIONS
Please have the x ray done  Start the shoulder exercises, follow up with PT  Okay to take ibuprofen 400 mg twice a day as needed for no more than 10 days    Keep an eye on your blood pressure

## 2022-06-21 DIAGNOSIS — I10 ESSENTIAL HYPERTENSION: ICD-10-CM

## 2022-06-22 RX ORDER — AMLODIPINE BESYLATE AND BENAZEPRIL HYDROCHLORIDE 5; 10 MG/1; MG/1
CAPSULE ORAL
Qty: 90 CAPSULE | Refills: 0 | Status: SHIPPED | OUTPATIENT
Start: 2022-06-22

## 2022-06-23 ENCOUNTER — APPOINTMENT (OUTPATIENT)
Dept: PHYSICAL THERAPY | Age: 82
End: 2022-06-23
Attending: INTERNAL MEDICINE
Payer: MEDICARE

## 2022-06-29 ENCOUNTER — APPOINTMENT (OUTPATIENT)
Dept: PHYSICAL THERAPY | Age: 82
End: 2022-06-29
Attending: INTERNAL MEDICINE
Payer: MEDICARE

## 2022-07-19 ENCOUNTER — TELEPHONE (OUTPATIENT)
Dept: PHYSICAL THERAPY | Facility: HOSPITAL | Age: 82
End: 2022-07-19

## 2022-07-19 ENCOUNTER — OFFICE VISIT (OUTPATIENT)
Dept: PHYSICAL THERAPY | Age: 82
End: 2022-07-19
Attending: INTERNAL MEDICINE
Payer: MEDICARE

## 2022-07-19 PROCEDURE — 97110 THERAPEUTIC EXERCISES: CPT

## 2022-07-19 PROCEDURE — 97161 PT EVAL LOW COMPLEX 20 MIN: CPT

## 2022-07-19 PROCEDURE — 97140 MANUAL THERAPY 1/> REGIONS: CPT

## 2022-07-25 ENCOUNTER — OFFICE VISIT (OUTPATIENT)
Dept: PHYSICAL THERAPY | Age: 82
End: 2022-07-25
Attending: INTERNAL MEDICINE
Payer: MEDICARE

## 2022-07-25 DIAGNOSIS — M25.512 ACUTE PAIN OF LEFT SHOULDER: ICD-10-CM

## 2022-07-25 PROCEDURE — 97110 THERAPEUTIC EXERCISES: CPT

## 2022-07-25 PROCEDURE — 97112 NEUROMUSCULAR REEDUCATION: CPT

## 2022-07-25 PROCEDURE — 97140 MANUAL THERAPY 1/> REGIONS: CPT

## 2022-07-25 NOTE — PROGRESS NOTES
Diagnosis:   L shoulder pain, OA    Referring Provider: Lizett Arellano  Date of Evaluation:    7/19/22    Precautions:  None   PMH: chronic L elbow flex contraction  Next MD visit:   none scheduled  Date of Surgery: n/a   Insurance Primary/Secondary: Lolis Gtz / N/A     # Auth Visits: 6: 7/19-9/19         Subjective: No pain at rest, except trouble finding a comfortable position at night. Pain: 6/10: end ranges      Objective:   L shoulder PROM flex 95*  deg, abd=95 deg, ER= 0      Assessment: Fair tolerance of AROM and AAROM with crepitus and limitations in motions all directions. Will monitor for post-treatment irritability as patient is sore during and after mild treatment. Needs cues throughout tx to avoid compensatory shrug. Goals: (to be met in  10 visits)   *Improve L shoulder AROM in flexion to allow patient to comb her hair  * Improve L shoulder internal rotation 20 degrees to allow patient to turn steering wheel without difficulty  * Improve L shoulder strength 1 grade to allow patient to reach a shelf that is shoulder height  * Improve overall ROM of L shoulder to allow patient to find a comfortable sleep position  * Increase L shoulder abduction to 90 deg without pain to allow patient to reach arm out of window at drive through        Plan: Cont with isometrics, AAROM, PROM, gentle joint mobilizations. Will need authorization for more after visits #6  Date: 7/25/2022  TX#: 2/6 auth ( 8 seth) Date:                 TX#: 3/ Date:                 TX#: 4/ Date:                 TX#: 5/ Date: Tx#: 6/   Reciprocal pulleys: warm up   4 mins   - scap sets x10  - shoulder circles x10  - Cervical lat flex x3 each side. Wand: shoulder abd  x10  Supine protraction x15  Supine flexion x10        Shoulder иван ER , then abd 10 sec x10        L side lying scapular mobs, STM to RTC muscles.   Supine: L Gr II-III posterior and inferior glides , AC joint mobs ,   Gentle traction       AA ROM in SL : L abd x8, protraction x10        Finger ladder L shoulder flexion x5  To 20th step          HEP: HEP for: scap sets, shoulder circles back, Codman's exercise, wand for shoulder abd, supine flexion.  Pt also completed short arc abduction, supine protraction, SL abd with AA      Charges: m, ex, neuroreed      Total Timed Treatment: 45 min  Total Treatment Time: 45 min

## 2022-07-27 ENCOUNTER — OFFICE VISIT (OUTPATIENT)
Dept: PHYSICAL THERAPY | Age: 82
End: 2022-07-27
Attending: INTERNAL MEDICINE
Payer: MEDICARE

## 2022-07-27 PROCEDURE — 97140 MANUAL THERAPY 1/> REGIONS: CPT

## 2022-07-27 PROCEDURE — 97110 THERAPEUTIC EXERCISES: CPT

## 2022-07-27 NOTE — PROGRESS NOTES
Diagnosis:   L shoulder pain, OA    Referring Provider: Michaelle Fields  Date of Evaluation:    7/19/22    Precautions:  None   PMH: chronic L elbow flex contraction  Next MD visit:   none scheduled  Date of Surgery: n/a   Insurance Primary/Secondary: Kaelyn Fuentes / N/A     # Auth Visits: 6: 7/19-9/19         Subjective: No pain at rest, except trouble finding a comfortable position at night. Pain: 6/10: end ranges      Objective:   Pre tx:  L shoulder AROM in standing:  flex 80* in 20-30* ABD, abd 70*, ER-10 IR 45*    Assessment: Tolerating tx fair. Moderate amount of crepitus from [facility] joint DJD. PROM improved to 100* flexion, 30* ER in neutral, 50* IR in neutral. AROM was unchanged after tx. Pt unable to maintain arm when placed in overhead elevation to end range or with ER to end range in standing. Adding scapular assist did not change. Suspect RC tears. Tx was progressed to assisted flexion with weight assist and AROM CKC slides, both tolerated poorly due to weakness, lack of control. Goals: (to be met in  10 visits)   *Improve L shoulder AROM in flexion to allow patient to comb her hair  * Improve L shoulder internal rotation 20 degrees to allow patient to turn steering wheel without difficulty  * Improve L shoulder strength 1 grade to allow patient to reach a shelf that is shoulder height  * Improve overall ROM of L shoulder to allow patient to find a comfortable sleep position  * Increase L shoulder abduction to 90 deg without pain to allow patient to reach arm out of window at drive through        Plan: Cont with isometrics, AAROM, PROM, gentle joint mobilizations. Will need authorization for more after visits #6  Date: 7/25/2022  TX#: 2/6 auth ( 8 seth) Date:   7/27              TX#: 3/ Date:                 TX#: 4/ Date:                 TX#: 5/ Date: Tx#: 6/   Reciprocal pulleys: warm up   4 mins   - scap sets x10  - shoulder circles x10  - Cervical lat flex x3 each side.  UBE B UE AROM CW and CCW, level1, 1 min bouts x 4 min  Reciprocal pulleys: warm up   4 mins         Wand: shoulder abd  x10  Supine protraction x15  Supine flexion x10  Supine:  Man PT: 10 min  L GH joint grade 2,3 post, infer, lateral glides      Shoulder иван ER , then abd 10 sec x10  L shoulder contract relax stretch into horiz and horiz add x 3 each      L side lying scapular mobs, STM to RTC muscles. Supine: L Gr II-III posterior and inferior glides , AC joint mobs ,   Gentle traction       AA ROM in SL : L abd x8, protraction x10  L shoulder PROM IR, ER in various degrees of ABD. Wand: shoulder abd  x10  Supine protraction x15  Supine flexion x10         Finger ladder L shoulder flexion x5  To 20th step    Sit: L shoulder flexion in 30* ABD with 2.5 wt assist with cable machine x 10 (hold)       Stand:  L shoulder CKC flexion in neutral on wall x 3 (hold)                 HEP: HEP for: scap sets, shoulder circles back, Codman's exercise, wand for shoulder abd, supine flexion.  Pt also completed short arc abduction, supine protraction, SL abd with AA      Charges: man 1 TE 2      Total Timed Treatment: 45 min  Total Treatment Time: 45 min

## 2022-08-01 ENCOUNTER — TELEPHONE (OUTPATIENT)
Dept: INTERNAL MEDICINE CLINIC | Facility: CLINIC | Age: 82
End: 2022-08-01

## 2022-08-01 ENCOUNTER — OFFICE VISIT (OUTPATIENT)
Dept: PHYSICAL THERAPY | Age: 82
End: 2022-08-01
Attending: INTERNAL MEDICINE
Payer: MEDICARE

## 2022-08-01 DIAGNOSIS — M25.512 ACUTE PAIN OF LEFT SHOULDER: Primary | ICD-10-CM

## 2022-08-01 PROCEDURE — 97140 MANUAL THERAPY 1/> REGIONS: CPT

## 2022-08-01 PROCEDURE — 97110 THERAPEUTIC EXERCISES: CPT

## 2022-08-01 NOTE — TELEPHONE ENCOUNTER
Referral placed for Dr. Sandra Miles. Will need to check status prior to scheduling OV.      No answer on home #  LM on mobile #

## 2022-08-01 NOTE — TELEPHONE ENCOUNTER
Patient is not gaining relief of left shoulder pain with PT, wants to continue with steroid injection. SV XR notes:    X ray shows severe arthritis of the shoulder. She should start the ibuprofen and physical therapy. If no improvement with it she can follow up for a steroid injection with the orthopedic surgeon      Referral needed for orthopedic surgeon. PT in agreement to discontinue tx at this time per visit note 8/1/22.

## 2022-08-01 NOTE — TELEPHONE ENCOUNTER
Patient informed. Explained referral is not authorized yet and it needs to be authorized before she can schedule appt.

## 2022-08-01 NOTE — PROGRESS NOTES
Diagnosis:   L shoulder pain, OA    Referring Provider: Georges Lozano  Date of Evaluation:    7/19/22    Precautions:  None   PMH: chronic L elbow flex contraction  Next MD visit:   none scheduled  Date of Surgery: n/a   Insurance Primary/Secondary: [employer] / N/A     # Auth Visits: 6: 7/19-9/19          Physical Therapy Progress Report  4 sessions completed    Subjective: Pt reports no improvement with L shoulder pain with raising arm or sleeping at night. Pain: 6/10: end ranges      Objective:   Pre tx:  L shoulder AROM in standing:  flex 80* in 20-30* ABD, abd 70*, ER-10 IR 45*    Assessment: Pt reports no improvement with L shoulder pain with arm use or sleeping. Pt has marked loss of ROM due to DJD. There is crepitus at the shoulder, pain with all PROM. Pt will most likely require an orthopedic consult for possible TSA. She may also benefit from a shoulder injection for pain control. She agrees with this POC and to hold any further PT due to no progress being made. She is to follow with Dr. Georges Lozano to discuss her options. Goals: (to be met in  10 visits)   *Improve L shoulder AROM in flexion to allow patient to comb her hair  * Improve L shoulder internal rotation 20 degrees to allow patient to turn steering wheel without difficulty  * Improve L shoulder strength 1 grade to allow patient to reach a shelf that is shoulder height  * Improve overall ROM of L shoulder to allow patient to find a comfortable sleep position  * Increase L shoulder abduction to 90 deg without pain to allow patient to reach arm out of window at drive through        Plan: Hold PT. Date: 7/25/2022  TX#: 2/6 auth ( 8 seth) Date:   7/27              TX#: 3/ Date:     8/1            TX#: 4/ Date:                 TX#: 5/ Date: Tx#: 6/   Reciprocal pulleys: warm up   4 mins   - scap sets x10  - shoulder circles x10  - Cervical lat flex x3 each side.  UBE B UE AROM CW and CCW, level1, 1 min bouts x 4 min  Reciprocal pulleys: warm up   4 mins UBE B UE AROM CW and CCW, level1, 1 min bouts x 4 min  Reciprocal pulleys: warm up   4 mins      Wand: shoulder abd  x10  Supine protraction x15  Supine flexion x10  Supine:  Man PT: 10 min  L GH joint grade 2,3 post, infer, lateral glides Supine:  Man PT: 10 min  L GH joint grade 2,3 post, infer, lateral glides     Shoulder иван ER , then abd 10 sec x10  L shoulder contract relax stretch into horiz and horiz add x 3 each L shoulder contract relax stretch into horiz and horiz add x 3 each     L side lying scapular mobs, STM to RTC muscles. Supine: L Gr II-III posterior and inferior glides , AC joint mobs ,   Gentle traction       AA ROM in SL : L abd x8, protraction x10  L shoulder PROM IR, ER in various degrees of ABD. Wand: shoulder abd  x10  Supine protraction x15  Supine flexion x10    L shoulder PROM IR, ER in various degrees of ABD. flexion    Supine AAROMflexion with wand x10     Standing shoulder abd AAROM with wand x10         Finger ladder L shoulder flexion x5  To 20th step    Sit: L shoulder flexion in 30* ABD with 2.5 wt assist with cable machine x 10 (hold) Shoulder иван ER , then abd 10 sec x10       Stand:  L shoulder CKC flexion in neutral on wall x 3 (hold)                 HEP: HEP for: scap sets, shoulder circles back, Codman's exercise, wand for shoulder abd, supine flexion.  Pt also completed short arc abduction, supine protraction, SL abd with AA      Charges: man 1 TE 2      Total Timed Treatment: 45 min  Total Treatment Time: 45 min

## 2022-08-03 ENCOUNTER — APPOINTMENT (OUTPATIENT)
Dept: PHYSICAL THERAPY | Age: 82
End: 2022-08-03
Attending: INTERNAL MEDICINE
Payer: MEDICARE

## 2022-08-08 ENCOUNTER — APPOINTMENT (OUTPATIENT)
Dept: PHYSICAL THERAPY | Age: 82
End: 2022-08-08
Attending: INTERNAL MEDICINE
Payer: MEDICARE

## 2022-08-15 ENCOUNTER — APPOINTMENT (OUTPATIENT)
Dept: PHYSICAL THERAPY | Age: 82
End: 2022-08-15
Attending: INTERNAL MEDICINE
Payer: MEDICARE

## 2022-08-17 ENCOUNTER — APPOINTMENT (OUTPATIENT)
Dept: PHYSICAL THERAPY | Age: 82
End: 2022-08-17
Attending: INTERNAL MEDICINE
Payer: MEDICARE

## 2022-08-17 ENCOUNTER — TELEPHONE (OUTPATIENT)
Dept: PHYSICAL THERAPY | Facility: HOSPITAL | Age: 82
End: 2022-08-17

## 2022-08-22 ENCOUNTER — OFFICE VISIT (OUTPATIENT)
Dept: ORTHOPEDICS CLINIC | Facility: CLINIC | Age: 82
End: 2022-08-22
Payer: MEDICARE

## 2022-08-22 ENCOUNTER — APPOINTMENT (OUTPATIENT)
Dept: PHYSICAL THERAPY | Age: 82
End: 2022-08-22
Attending: INTERNAL MEDICINE
Payer: MEDICARE

## 2022-08-22 VITALS — HEIGHT: 66 IN | BODY MASS INDEX: 22.82 KG/M2 | WEIGHT: 142 LBS

## 2022-08-22 DIAGNOSIS — M19.012 PRIMARY OSTEOARTHRITIS OF LEFT SHOULDER: Primary | ICD-10-CM

## 2022-08-22 RX ORDER — TRIAMCINOLONE ACETONIDE 40 MG/ML
40 INJECTION, SUSPENSION INTRA-ARTICULAR; INTRAMUSCULAR ONCE
Status: COMPLETED | OUTPATIENT
Start: 2022-08-22 | End: 2022-08-22

## 2022-08-22 RX ADMIN — TRIAMCINOLONE ACETONIDE 40 MG: 40 INJECTION, SUSPENSION INTRA-ARTICULAR; INTRAMUSCULAR at 16:11:00

## 2022-09-26 ENCOUNTER — TELEPHONE (OUTPATIENT)
Dept: ADMINISTRATIVE | Age: 82
End: 2022-09-26

## 2022-09-26 DIAGNOSIS — L60.3 DYSTROPHIC NAIL: Primary | ICD-10-CM

## 2022-09-26 DIAGNOSIS — L60.0 INGROWN NAIL OF GREAT TOE: ICD-10-CM

## 2022-09-26 NOTE — TELEPHONE ENCOUNTER
Patient requested a new referral to see podiatry Alcon Solano review and sign plan and care if you agree Thank you. Dick Saunders V    [facility].

## 2022-10-11 ENCOUNTER — TELEPHONE (OUTPATIENT)
Dept: ADMINISTRATIVE | Age: 82
End: 2022-10-11

## 2022-10-11 DIAGNOSIS — G89.29 TOE PAIN, CHRONIC, RIGHT: Primary | ICD-10-CM

## 2022-10-11 DIAGNOSIS — M79.674 TOE PAIN, CHRONIC, RIGHT: Primary | ICD-10-CM

## 2022-10-11 NOTE — TELEPHONE ENCOUNTER
Patient requested a new referral to see Cindy Dela Cruz, Please review and sign plan and care if you agree Thank you. Brian Carlisle V    [facility].

## 2022-10-18 DIAGNOSIS — E78.00 PURE HYPERCHOLESTEROLEMIA: Chronic | ICD-10-CM

## 2022-10-19 RX ORDER — PRAVASTATIN SODIUM 20 MG
TABLET ORAL
Qty: 90 TABLET | Refills: 0 | Status: SHIPPED | OUTPATIENT
Start: 2022-10-19

## 2022-10-19 NOTE — TELEPHONE ENCOUNTER
Protocol passed     Requesting: pravastatin 20mg     LOV: 6/14/22   RTC: none noted   Filled: 5/31/22 #90 0 refills   Recent Labs: 4/11/22     Upcoming OV: none scheduled

## 2022-10-24 ENCOUNTER — TELEPHONE (OUTPATIENT)
Dept: INTERNAL MEDICINE CLINIC | Facility: CLINIC | Age: 82
End: 2022-10-24

## 2022-10-24 DIAGNOSIS — Z12.31 ENCOUNTER FOR SCREENING MAMMOGRAM FOR MALIGNANT NEOPLASM OF BREAST: Primary | ICD-10-CM

## 2022-11-11 ENCOUNTER — HOSPITAL ENCOUNTER (OUTPATIENT)
Dept: MAMMOGRAPHY | Facility: HOSPITAL | Age: 82
Discharge: HOME | End: 2022-11-11
Attending: INTERNAL MEDICINE
Payer: MEDICARE

## 2022-11-11 DIAGNOSIS — Z12.31 ENCOUNTER FOR SCREENING MAMMOGRAM FOR MALIGNANT NEOPLASM OF BREAST: ICD-10-CM

## 2022-11-11 PROCEDURE — 77067 SCR MAMMO BI INCL CAD: CPT | Performed by: INTERNAL MEDICINE

## 2022-11-11 PROCEDURE — 77063 BREAST TOMOSYNTHESIS BI: CPT | Performed by: INTERNAL MEDICINE

## 2022-11-16 DIAGNOSIS — I10 ESSENTIAL HYPERTENSION: ICD-10-CM

## 2022-11-17 RX ORDER — AMLODIPINE BESYLATE AND BENAZEPRIL HYDROCHLORIDE 5; 10 MG/1; MG/1
CAPSULE ORAL
Qty: 90 CAPSULE | Refills: 0 | Status: SHIPPED | OUTPATIENT
Start: 2022-11-17

## 2023-02-06 ENCOUNTER — OFFICE VISIT (OUTPATIENT)
Dept: INTERNAL MEDICINE CLINIC | Facility: CLINIC | Age: 83
End: 2023-02-06
Payer: MEDICARE

## 2023-02-06 VITALS
DIASTOLIC BLOOD PRESSURE: 60 MMHG | OXYGEN SATURATION: 100 % | HEIGHT: 66 IN | WEIGHT: 150 LBS | RESPIRATION RATE: 16 BRPM | TEMPERATURE: 97 F | BODY MASS INDEX: 24.11 KG/M2 | SYSTOLIC BLOOD PRESSURE: 118 MMHG | HEART RATE: 73 BPM

## 2023-02-06 DIAGNOSIS — R15.9 FULL INCONTINENCE OF FECES: Chronic | ICD-10-CM

## 2023-02-06 DIAGNOSIS — M19.012 PRIMARY OSTEOARTHRITIS OF LEFT SHOULDER: ICD-10-CM

## 2023-02-06 DIAGNOSIS — N81.4 PROLAPSED UTERUS: Chronic | ICD-10-CM

## 2023-02-06 DIAGNOSIS — E78.00 PURE HYPERCHOLESTEROLEMIA: Chronic | ICD-10-CM

## 2023-02-06 DIAGNOSIS — Z00.00 ROUTINE GENERAL MEDICAL EXAMINATION AT A HEALTH CARE FACILITY: Primary | ICD-10-CM

## 2023-02-06 DIAGNOSIS — I10 ESSENTIAL HYPERTENSION: Chronic | ICD-10-CM

## 2023-02-06 DIAGNOSIS — Z85.828 HISTORY OF SKIN CANCER: Chronic | ICD-10-CM

## 2023-02-06 DIAGNOSIS — M81.0 SENILE OSTEOPOROSIS: Chronic | ICD-10-CM

## 2023-02-06 PROBLEM — J41.0 SMOKERS' COUGH (HCC): Chronic | Status: ACTIVE | Noted: 2023-02-06

## 2023-02-06 PROBLEM — J41.0 SMOKERS' COUGH (HCC): Chronic | Status: RESOLVED | Noted: 2023-02-06 | Resolved: 2023-02-06

## 2023-02-06 LAB
ATRIAL RATE: 64 BPM
P AXIS: 59 DEGREES
P-R INTERVAL: 172 MS
Q-T INTERVAL: 424 MS
QRS DURATION: 74 MS
QTC CALCULATION (BEZET): 437 MS
R AXIS: 12 DEGREES
T AXIS: 44 DEGREES
VENTRICULAR RATE: 64 BPM

## 2023-02-13 ENCOUNTER — LAB ENCOUNTER (OUTPATIENT)
Dept: LAB | Age: 83
End: 2023-02-13
Attending: INTERNAL MEDICINE
Payer: MEDICARE

## 2023-02-13 LAB
ALBUMIN SERPL-MCNC: 3.7 G/DL (ref 3.4–5)
ALBUMIN/GLOB SERPL: 1.4 {RATIO} (ref 1–2)
ALP LIVER SERPL-CCNC: 100 U/L
ALT SERPL-CCNC: 27 U/L
ANION GAP SERPL CALC-SCNC: 6 MMOL/L (ref 0–18)
AST SERPL-CCNC: 24 U/L (ref 15–37)
BASOPHILS # BLD AUTO: 0.07 X10(3) UL (ref 0–0.2)
BASOPHILS NFR BLD AUTO: 0.8 %
BILIRUB SERPL-MCNC: 0.4 MG/DL (ref 0.1–2)
BILIRUB UR QL STRIP.AUTO: NEGATIVE
BUN BLD-MCNC: 11 MG/DL (ref 7–18)
CALCIUM BLD-MCNC: 9.2 MG/DL (ref 8.5–10.1)
CHLORIDE SERPL-SCNC: 101 MMOL/L (ref 98–112)
CHOLEST SERPL-MCNC: 146 MG/DL (ref ?–200)
CLARITY UR REFRACT.AUTO: CLEAR
CO2 SERPL-SCNC: 28 MMOL/L (ref 21–32)
COLOR UR AUTO: YELLOW
CREAT BLD-MCNC: 0.68 MG/DL
EOSINOPHIL # BLD AUTO: 0.1 X10(3) UL (ref 0–0.7)
EOSINOPHIL NFR BLD AUTO: 1.1 %
ERYTHROCYTE [DISTWIDTH] IN BLOOD BY AUTOMATED COUNT: 11.9 %
EST. AVERAGE GLUCOSE BLD GHB EST-MCNC: 117 MG/DL (ref 68–126)
FASTING PATIENT LIPID ANSWER: YES
FASTING STATUS PATIENT QL REPORTED: YES
GFR SERPLBLD BASED ON 1.73 SQ M-ARVRAT: 87 ML/MIN/1.73M2 (ref 60–?)
GLOBULIN PLAS-MCNC: 2.7 G/DL (ref 2.8–4.4)
GLUCOSE BLD-MCNC: 93 MG/DL (ref 70–99)
GLUCOSE UR STRIP.AUTO-MCNC: NEGATIVE MG/DL
HBA1C MFR BLD: 5.7 % (ref ?–5.7)
HCT VFR BLD AUTO: 42.7 %
HDLC SERPL-MCNC: 49 MG/DL (ref 40–59)
HGB BLD-MCNC: 14 G/DL
IMM GRANULOCYTES # BLD AUTO: 0.02 X10(3) UL (ref 0–1)
IMM GRANULOCYTES NFR BLD: 0.2 %
KETONES UR STRIP.AUTO-MCNC: NEGATIVE MG/DL
LDLC SERPL CALC-MCNC: 75 MG/DL (ref ?–100)
LYMPHOCYTES # BLD AUTO: 1.83 X10(3) UL (ref 1–4)
LYMPHOCYTES NFR BLD AUTO: 20.7 %
MCH RBC QN AUTO: 31.5 PG (ref 26–34)
MCHC RBC AUTO-ENTMCNC: 32.8 G/DL (ref 31–37)
MCV RBC AUTO: 96.2 FL
MONOCYTES # BLD AUTO: 0.74 X10(3) UL (ref 0.1–1)
MONOCYTES NFR BLD AUTO: 8.4 %
NEUTROPHILS # BLD AUTO: 6.08 X10 (3) UL (ref 1.5–7.7)
NEUTROPHILS # BLD AUTO: 6.08 X10(3) UL (ref 1.5–7.7)
NEUTROPHILS NFR BLD AUTO: 68.8 %
NITRITE UR QL STRIP.AUTO: NEGATIVE
NONHDLC SERPL-MCNC: 97 MG/DL (ref ?–130)
OSMOLALITY SERPL CALC.SUM OF ELEC: 279 MOSM/KG (ref 275–295)
PH UR STRIP.AUTO: 7 [PH] (ref 5–8)
PLATELET # BLD AUTO: 236 10(3)UL (ref 150–450)
POTASSIUM SERPL-SCNC: 4.2 MMOL/L (ref 3.5–5.1)
PROT SERPL-MCNC: 6.4 G/DL (ref 6.4–8.2)
PROT UR STRIP.AUTO-MCNC: NEGATIVE MG/DL
RBC # BLD AUTO: 4.44 X10(6)UL
SODIUM SERPL-SCNC: 135 MMOL/L (ref 136–145)
SP GR UR STRIP.AUTO: 1 (ref 1–1.03)
TRIGL SERPL-MCNC: 125 MG/DL (ref 30–149)
TSI SER-ACNC: 4.24 MIU/ML (ref 0.36–3.74)
UROBILINOGEN UR STRIP.AUTO-MCNC: <2 MG/DL
VLDLC SERPL CALC-MCNC: 19 MG/DL (ref 0–30)
WBC # BLD AUTO: 8.8 X10(3) UL (ref 4–11)

## 2023-02-13 PROCEDURE — 80061 LIPID PANEL: CPT | Performed by: INTERNAL MEDICINE

## 2023-02-13 PROCEDURE — 80053 COMPREHEN METABOLIC PANEL: CPT | Performed by: INTERNAL MEDICINE

## 2023-02-13 PROCEDURE — 83036 HEMOGLOBIN GLYCOSYLATED A1C: CPT | Performed by: INTERNAL MEDICINE

## 2023-02-13 PROCEDURE — 81001 URINALYSIS AUTO W/SCOPE: CPT | Performed by: INTERNAL MEDICINE

## 2023-02-13 PROCEDURE — 85025 COMPLETE CBC W/AUTO DIFF WBC: CPT | Performed by: INTERNAL MEDICINE

## 2023-02-13 PROCEDURE — 36415 COLL VENOUS BLD VENIPUNCTURE: CPT | Performed by: INTERNAL MEDICINE

## 2023-02-13 PROCEDURE — 84443 ASSAY THYROID STIM HORMONE: CPT | Performed by: INTERNAL MEDICINE

## 2023-02-20 ENCOUNTER — TELEPHONE (OUTPATIENT)
Dept: INTERNAL MEDICINE CLINIC | Facility: CLINIC | Age: 83
End: 2023-02-20

## 2023-02-20 DIAGNOSIS — R79.89 ELEVATED TSH: Primary | ICD-10-CM

## 2023-02-20 DIAGNOSIS — R82.90 ABNORMAL FINDING ON URINALYSIS: ICD-10-CM

## 2023-02-20 NOTE — TELEPHONE ENCOUNTER
----- Message from Alexander Carter MD sent at 2/20/2023  9:34 AM CST -----  Reviewed results   Please order free T4 urinalysis and urine culture.   This is to evaluate the abnormal thyroid and urinalysis -rest of the blood work is normal

## 2023-02-27 ENCOUNTER — LAB ENCOUNTER (OUTPATIENT)
Dept: LAB | Age: 83
End: 2023-02-27
Attending: INTERNAL MEDICINE
Payer: MEDICARE

## 2023-02-27 LAB
BILIRUB UR QL STRIP.AUTO: NEGATIVE
CLARITY UR REFRACT.AUTO: CLEAR
COLOR UR AUTO: YELLOW
GLUCOSE UR STRIP.AUTO-MCNC: NEGATIVE MG/DL
KETONES UR STRIP.AUTO-MCNC: NEGATIVE MG/DL
NITRITE UR QL STRIP.AUTO: NEGATIVE
PH UR STRIP.AUTO: 6 [PH] (ref 5–8)
PROT UR STRIP.AUTO-MCNC: NEGATIVE MG/DL
SP GR UR STRIP.AUTO: 1.01 (ref 1–1.03)
T4 FREE SERPL-MCNC: 0.9 NG/DL (ref 0.8–1.7)
UROBILINOGEN UR STRIP.AUTO-MCNC: <2 MG/DL

## 2023-02-27 PROCEDURE — 87086 URINE CULTURE/COLONY COUNT: CPT | Performed by: INTERNAL MEDICINE

## 2023-02-27 PROCEDURE — 84439 ASSAY OF FREE THYROXINE: CPT | Performed by: INTERNAL MEDICINE

## 2023-02-27 PROCEDURE — 81001 URINALYSIS AUTO W/SCOPE: CPT | Performed by: INTERNAL MEDICINE

## 2023-02-27 PROCEDURE — 36415 COLL VENOUS BLD VENIPUNCTURE: CPT | Performed by: INTERNAL MEDICINE

## 2023-03-22 DIAGNOSIS — E78.00 PURE HYPERCHOLESTEROLEMIA: Chronic | ICD-10-CM

## 2023-03-23 RX ORDER — PRAVASTATIN SODIUM 20 MG
TABLET ORAL
Qty: 90 TABLET | Refills: 0 | Status: SHIPPED | OUTPATIENT
Start: 2023-03-23

## 2023-03-23 NOTE — TELEPHONE ENCOUNTER
Protocol passed     Requesting: pravastatin 20mg   LOV: 2/6/23   RTC: 6 months   Filled: 10/19/22 # 90 0 refills   Recent Labs: 2/13/23     Upcoming OV: none scheduled

## 2023-05-09 DIAGNOSIS — I10 ESSENTIAL HYPERTENSION: ICD-10-CM

## 2023-05-10 RX ORDER — AMLODIPINE BESYLATE AND BENAZEPRIL HYDROCHLORIDE 5; 10 MG/1; MG/1
CAPSULE ORAL
Qty: 90 CAPSULE | Refills: 0 | Status: SHIPPED | OUTPATIENT
Start: 2023-05-10

## 2023-05-16 ENCOUNTER — APPOINTMENT (OUTPATIENT)
Dept: URBAN - METROPOLITAN AREA CLINIC 242 | Age: 83
Setting detail: DERMATOLOGY
End: 2023-05-16

## 2023-05-16 DIAGNOSIS — L21.8 OTHER SEBORRHEIC DERMATITIS: ICD-10-CM

## 2023-05-16 DIAGNOSIS — L82.0 INFLAMED SEBORRHEIC KERATOSIS: ICD-10-CM

## 2023-05-16 DIAGNOSIS — L57.0 ACTINIC KERATOSIS: ICD-10-CM

## 2023-05-16 DIAGNOSIS — D18.0 HEMANGIOMA: ICD-10-CM

## 2023-05-16 DIAGNOSIS — L82.1 OTHER SEBORRHEIC KERATOSIS: ICD-10-CM

## 2023-05-16 DIAGNOSIS — Z85.828 PERSONAL HISTORY OF OTHER MALIGNANT NEOPLASM OF SKIN: ICD-10-CM

## 2023-05-16 DIAGNOSIS — D22 MELANOCYTIC NEVI: ICD-10-CM

## 2023-05-16 PROBLEM — D18.01 HEMANGIOMA OF SKIN AND SUBCUTANEOUS TISSUE: Status: ACTIVE | Noted: 2023-05-16

## 2023-05-16 PROBLEM — D22.5 MELANOCYTIC NEVI OF TRUNK: Status: ACTIVE | Noted: 2023-05-16

## 2023-05-16 PROCEDURE — OTHER LIQUID NITROGEN: OTHER

## 2023-05-16 PROCEDURE — 17110 DESTRUCT B9 LESION 1-14: CPT

## 2023-05-16 PROCEDURE — 17000 DESTRUCT PREMALG LESION: CPT | Mod: 59

## 2023-05-16 PROCEDURE — OTHER COUNSELING: OTHER

## 2023-05-16 PROCEDURE — OTHER PRESCRIPTION MEDICATION MANAGEMENT: OTHER

## 2023-05-16 PROCEDURE — 17003 DESTRUCT PREMALG LES 2-14: CPT | Mod: 59

## 2023-05-16 PROCEDURE — 99203 OFFICE O/P NEW LOW 30 MIN: CPT | Mod: 25

## 2023-05-16 PROCEDURE — OTHER PRESCRIPTION: OTHER

## 2023-05-16 RX ORDER — KETOCONAZOLE 20 MG/ML
SHAMPOO, SUSPENSION TOPICAL
Qty: 120 | Refills: 0 | Status: ERX | COMMUNITY
Start: 2023-05-16

## 2023-05-16 ASSESSMENT — LOCATION SIMPLE DESCRIPTION DERM
LOCATION SIMPLE: ABDOMEN
LOCATION SIMPLE: LEFT FOREHEAD
LOCATION SIMPLE: ANTERIOR SCALP
LOCATION SIMPLE: LEFT EYEBROW
LOCATION SIMPLE: LEFT ANTERIOR NECK
LOCATION SIMPLE: UPPER BACK
LOCATION SIMPLE: LEFT BREAST
LOCATION SIMPLE: RIGHT PRETIBIAL REGION
LOCATION SIMPLE: RIGHT FOREARM
LOCATION SIMPLE: CHEST
LOCATION SIMPLE: LEFT UPPER BACK
LOCATION SIMPLE: LEFT FOREARM

## 2023-05-16 ASSESSMENT — LOCATION ZONE DERM
LOCATION ZONE: FACE
LOCATION ZONE: LEG
LOCATION ZONE: SCALP
LOCATION ZONE: ARM
LOCATION ZONE: NECK
LOCATION ZONE: TRUNK

## 2023-05-16 ASSESSMENT — LOCATION DETAILED DESCRIPTION DERM
LOCATION DETAILED: LEFT INFERIOR ANTERIOR NECK
LOCATION DETAILED: LEFT PROXIMAL DORSAL FOREARM
LOCATION DETAILED: LEFT LATERAL EYEBROW
LOCATION DETAILED: LEFT MEDIAL BREAST 10-11:00 REGION
LOCATION DETAILED: RIGHT RIB CAGE
LOCATION DETAILED: RIGHT PROXIMAL RADIAL DORSAL FOREARM
LOCATION DETAILED: MIDDLE STERNUM
LOCATION DETAILED: MID-FRONTAL SCALP
LOCATION DETAILED: SUPERIOR THORACIC SPINE
LOCATION DETAILED: LEFT SUPERIOR MEDIAL UPPER BACK
LOCATION DETAILED: LEFT MEDIAL SUPERIOR CHEST
LOCATION DETAILED: INFERIOR THORACIC SPINE
LOCATION DETAILED: RIGHT LATERAL SUPERIOR CHEST
LOCATION DETAILED: LEFT MEDIAL FOREHEAD
LOCATION DETAILED: RIGHT DISTAL PRETIBIAL REGION

## 2023-05-16 NOTE — PROCEDURE: LIQUID NITROGEN
Post-Care Instructions: I reviewed with the patient in detail post-care instructions. Patient is to wear sunprotection, and avoid picking at any of the treated lesions. Pt may apply Vaseline to crusted or scabbing areas.
Medical Necessity Clause: This procedure was medically necessary because the lesions that were treated were:
Show Spray Paint Technique Variable?: Yes
Consent: The patient's consent was obtained including but not limited to risks of crusting, scabbing, blistering, scarring, darker or lighter pigmentary change, recurrence, incomplete removal and infection.
Detail Level: Detailed
Render Note In Bullet Format When Appropriate: No
Detail Level: Simple
Number Of Freeze-Thaw Cycles: 3 freeze-thaw cycles
Duration Of Freeze Thaw-Cycle (Seconds): 5-10
Application Tool (Optional): Liquid Nitrogen Sprayer
Number Of Freeze-Thaw Cycles: 1 freeze-thaw cycle
Spray Paint Text: The liquid nitrogen was applied to the skin utilizing a spray paint frosting technique.
Duration Of Freeze Thaw-Cycle (Seconds): 5
Medical Necessity Information: It is in your best interest to select a reason for this procedure from the list below. All of these items fulfill various CMS LCD requirements except the new and changing color options.

## 2023-05-16 NOTE — PROCEDURE: PRESCRIPTION MEDICATION MANAGEMENT
Render In Strict Bullet Format?: No
Initiate Treatment: Ketoconazole shampoo 2% qd
Detail Level: Zone

## 2023-09-14 ENCOUNTER — TELEPHONE (OUTPATIENT)
Dept: INTERNAL MEDICINE CLINIC | Facility: CLINIC | Age: 83
End: 2023-09-14

## 2023-10-02 ENCOUNTER — OFFICE VISIT (OUTPATIENT)
Dept: FAMILY MEDICINE CLINIC | Facility: CLINIC | Age: 83
End: 2023-10-02
Payer: MEDICARE

## 2023-10-02 VITALS
BODY MASS INDEX: 23.68 KG/M2 | HEART RATE: 68 BPM | DIASTOLIC BLOOD PRESSURE: 70 MMHG | HEIGHT: 66.5 IN | RESPIRATION RATE: 16 BRPM | WEIGHT: 149.13 LBS | SYSTOLIC BLOOD PRESSURE: 150 MMHG | OXYGEN SATURATION: 99 % | TEMPERATURE: 98 F

## 2023-10-02 DIAGNOSIS — R82.90 ABNORMAL URINE FINDINGS: ICD-10-CM

## 2023-10-02 DIAGNOSIS — M54.50 ACUTE LEFT-SIDED LOW BACK PAIN, UNSPECIFIED WHETHER SCIATICA PRESENT: Primary | ICD-10-CM

## 2023-10-02 LAB
APPEARANCE: CLEAR
BILIRUBIN: NEGATIVE
GLUCOSE (URINE DIPSTICK): NEGATIVE MG/DL
KETONES (URINE DIPSTICK): NEGATIVE MG/DL
MULTISTIX LOT#: ABNORMAL NUMERIC
NITRITE, URINE: NEGATIVE
PH, URINE: 6.5 (ref 4.5–8)
PROTEIN (URINE DIPSTICK): NEGATIVE MG/DL
SPECIFIC GRAVITY: 1.01 (ref 1–1.03)
URINE-COLOR: YELLOW
UROBILINOGEN,SEMI-QN: 0.2 MG/DL (ref 0–1.9)

## 2023-10-02 PROCEDURE — 87086 URINE CULTURE/COLONY COUNT: CPT

## 2023-10-02 RX ORDER — CEPHALEXIN 500 MG/1
500 CAPSULE ORAL 2 TIMES DAILY
Qty: 14 CAPSULE | Refills: 0 | Status: SHIPPED | OUTPATIENT
Start: 2023-10-02 | End: 2023-10-09

## 2023-10-10 ENCOUNTER — OFFICE VISIT (OUTPATIENT)
Dept: INTERNAL MEDICINE CLINIC | Facility: CLINIC | Age: 83
End: 2023-10-10
Payer: MEDICARE

## 2023-10-10 VITALS
RESPIRATION RATE: 14 BRPM | HEART RATE: 71 BPM | SYSTOLIC BLOOD PRESSURE: 124 MMHG | OXYGEN SATURATION: 100 % | DIASTOLIC BLOOD PRESSURE: 68 MMHG | HEIGHT: 66.5 IN | BODY MASS INDEX: 23.56 KG/M2 | TEMPERATURE: 98 F | WEIGHT: 148.38 LBS

## 2023-10-10 DIAGNOSIS — M54.50 ACUTE LEFT-SIDED LOW BACK PAIN WITHOUT SCIATICA: Primary | ICD-10-CM

## 2023-10-10 DIAGNOSIS — Z23 FLU VACCINE NEED: ICD-10-CM

## 2023-10-10 PROCEDURE — 1159F MED LIST DOCD IN RCRD: CPT | Performed by: INTERNAL MEDICINE

## 2023-10-10 PROCEDURE — 90662 IIV NO PRSV INCREASED AG IM: CPT | Performed by: INTERNAL MEDICINE

## 2023-10-10 PROCEDURE — 3074F SYST BP LT 130 MM HG: CPT | Performed by: INTERNAL MEDICINE

## 2023-10-10 PROCEDURE — G0008 ADMIN INFLUENZA VIRUS VAC: HCPCS | Performed by: INTERNAL MEDICINE

## 2023-10-10 PROCEDURE — 1160F RVW MEDS BY RX/DR IN RCRD: CPT | Performed by: INTERNAL MEDICINE

## 2023-10-10 PROCEDURE — 3078F DIAST BP <80 MM HG: CPT | Performed by: INTERNAL MEDICINE

## 2023-10-10 PROCEDURE — 3008F BODY MASS INDEX DOCD: CPT | Performed by: INTERNAL MEDICINE

## 2023-10-10 PROCEDURE — 99213 OFFICE O/P EST LOW 20 MIN: CPT | Performed by: INTERNAL MEDICINE

## 2023-10-10 NOTE — PATIENT INSTRUCTIONS
Continue the Tylenol. Try heat for 20 minutes 3 times a day. Try lidocaine or Salonpas patches    You received the flu shot today.  You can get the covid vaccine through the pharmacy     See us back in 4 weeks with

## 2023-11-24 ENCOUNTER — HOSPITAL ENCOUNTER (OUTPATIENT)
Dept: MAMMOGRAPHY | Age: 83
Discharge: HOME | End: 2023-11-24
Attending: INTERNAL MEDICINE
Payer: MEDICARE

## 2023-11-24 DIAGNOSIS — Z12.31 ENCOUNTER FOR SCREENING MAMMOGRAM FOR MALIGNANT NEOPLASM OF BREAST: ICD-10-CM

## 2023-11-24 PROCEDURE — 77063 BREAST TOMOSYNTHESIS BI: CPT | Performed by: INTERNAL MEDICINE

## 2023-11-24 PROCEDURE — 77067 SCR MAMMO BI INCL CAD: CPT | Performed by: INTERNAL MEDICINE

## 2023-12-11 ENCOUNTER — TELEPHONE (OUTPATIENT)
Dept: INTERNAL MEDICINE CLINIC | Facility: CLINIC | Age: 83
End: 2023-12-11

## 2023-12-11 DIAGNOSIS — M79.674 CHRONIC PAIN OF TOE OF RIGHT FOOT: Primary | ICD-10-CM

## 2023-12-11 DIAGNOSIS — G89.29 CHRONIC PAIN OF TOE OF RIGHT FOOT: Primary | ICD-10-CM

## 2023-12-11 NOTE — TELEPHONE ENCOUNTER
External podiatry referral placed, per protocol. Referral is in open status, monitor for AUTH to fax and notify the pt.

## 2023-12-11 NOTE — TELEPHONE ENCOUNTER
Patient requesting referral for upcoming appointment in January. Previous referral .     Name  [address], [address]  [id number]     Specialty  [id number]  Provider Role  Service Provider     Address  [address] [facility], [address], [place] [address] Fariha Lori Maloney     Phone  [phone]

## 2023-12-18 ENCOUNTER — HOSPITAL ENCOUNTER (OUTPATIENT)
Dept: MAMMOGRAPHY | Facility: HOSPITAL | Age: 83
Discharge: HOME | End: 2023-12-18
Attending: INTERNAL MEDICINE
Payer: MEDICARE

## 2023-12-18 DIAGNOSIS — R92.2 INCONCLUSIVE MAMMOGRAM: ICD-10-CM

## 2023-12-18 PROCEDURE — 77065 DX MAMMO INCL CAD UNI: CPT | Performed by: INTERNAL MEDICINE

## 2023-12-18 PROCEDURE — 77061 BREAST TOMOSYNTHESIS UNI: CPT | Performed by: INTERNAL MEDICINE

## 2024-04-16 ENCOUNTER — OFFICE VISIT (OUTPATIENT)
Dept: INTERNAL MEDICINE CLINIC | Facility: CLINIC | Age: 84
End: 2024-04-16
Payer: MEDICARE

## 2024-04-16 VITALS
HEIGHT: 66.5 IN | BODY MASS INDEX: 23.82 KG/M2 | DIASTOLIC BLOOD PRESSURE: 60 MMHG | WEIGHT: 150 LBS | RESPIRATION RATE: 16 BRPM | TEMPERATURE: 97 F | OXYGEN SATURATION: 97 % | HEART RATE: 72 BPM | SYSTOLIC BLOOD PRESSURE: 136 MMHG

## 2024-04-16 DIAGNOSIS — M81.0 SENILE OSTEOPOROSIS: Chronic | ICD-10-CM

## 2024-04-16 DIAGNOSIS — I10 ESSENTIAL HYPERTENSION: Chronic | ICD-10-CM

## 2024-04-16 DIAGNOSIS — Z00.00 ROUTINE GENERAL MEDICAL EXAMINATION AT A HEALTH CARE FACILITY: Primary | ICD-10-CM

## 2024-04-16 DIAGNOSIS — R15.9 INCONTINENCE OF FECES, UNSPECIFIED FECAL INCONTINENCE TYPE: Chronic | ICD-10-CM

## 2024-04-16 DIAGNOSIS — N81.4 PROLAPSED UTERUS: Chronic | ICD-10-CM

## 2024-04-16 DIAGNOSIS — Z85.828 HISTORY OF SKIN CANCER: Chronic | ICD-10-CM

## 2024-04-16 DIAGNOSIS — E78.00 PURE HYPERCHOLESTEROLEMIA: Chronic | ICD-10-CM

## 2024-04-16 DIAGNOSIS — M19.012 PRIMARY OSTEOARTHRITIS OF LEFT SHOULDER: Chronic | ICD-10-CM

## 2024-04-16 NOTE — PROGRESS NOTES
REASON FOR VISIT:    Radha Sifuentes is a 83 year old female who presents for a MA Supervisit.    Female      Patient Care Team: Patient Care Team:  Rm Tuttle MD as PCP - General (Internal Medicine)  Luis Armando Faith MD (OPHTHALMOLOGY)  Juan Alberto Cornelius, TOMMY as Physical Therapist (Physical Therapy)  Justin Mcpherson, PT as Physical Therapist    Patient Active Problem List   Diagnosis    Senile osteoporosis    Pure hypercholesterolemia    Essential hypertension    Prolapsed uterus    Incontinence of feces    History of skin cancer    Primary osteoarthritis of left shoulder     Current Outpatient Medications   Medication Sig Dispense Refill    AMLODIPINE BESY-BENAZEPRIL HCL 5-10 MG Oral Cap TAKE 1 CAPSULE EVERY DAY 90 capsule 0    PRAVASTATIN 20 MG Oral Tab TAKE 1 TABLET EVERY NIGHT 90 tablet 0    Glucosamine 750 MG Oral Tab Take 2 tablets by mouth daily.      Multiple Vitamin (MULTI-VITAMIN DAILY) Oral Tab Take 1 tablet by mouth daily.      Acetaminophen (TYLENOL EXTRA STRENGTH OR) Take 1-4 tablets by mouth daily.      Calcium Carbonate-Vitamin D 500-200 MG-UNIT Oral Tab Take 1 tablet by mouth 2 (two) times daily. Take with food      Cholecalciferol (VITAMIN D) 2000 UNITS Oral Cap Take 1 tablet by mouth daily.             Latest Ref Rng & Units 2/13/2023     7:56 AM 4/11/2022     7:54 AM 6/14/2021     8:07 AM 7/1/2020     7:07 AM 1/16/2020     8:11 AM 6/13/2019     8:24 AM 3/8/2018     7:52 AM   Glucose and HbA1c   Glucose 70 - 99 mg/dL 93  95  92  89  86  92  91          Latest Ref Rng & Units 2/13/2023     7:56 AM 4/11/2022     7:54 AM 6/14/2021     8:07 AM 7/1/2020     7:07 AM 1/16/2020     8:11 AM 8/1/2019     8:30 AM 6/13/2019     8:24 AM   Cholesterol   Total Cholesterol <200 mg/dL 146  161  160  166  181  144  212    Triglycerides 30 - 149 mg/dL 125  131  172  175  150  138  195    HDL 40 - 59 mg/dL 49  51  43  41  46  40  44    LDL <100 mg/dL 75  87  87  90  105  76  129          Latest Ref Rng & Units  2/13/2023     7:56 AM 4/11/2022     7:54 AM 6/14/2021     8:07 AM 7/1/2020     7:07 AM 1/16/2020     8:11 AM 6/13/2019     8:24 AM 3/8/2018     7:52 AM   BUN and Cr   BUN 7 - 18 mg/dL 11  9  10  12  14  15  12    Creatinine 0.55 - 1.02 mg/dL 0.68  0.66  0.75  0.85  0.70  0.81  0.92          Latest Ref Rng & Units 2/13/2023     7:56 AM 4/11/2022     7:54 AM 6/14/2021     8:07 AM 7/1/2020     7:07 AM 1/16/2020     8:11 AM 8/1/2019     8:30 AM 6/13/2019     8:24 AM   AST and ALT   AST (SGOT) 15 - 37 U/L 24  23  22  19  20  17  19    ALT (SGPT) 13 - 56 U/L 27  28  22  23  27  21  24          Latest Ref Rng & Units 2/27/2023     8:21 AM 2/13/2023     7:56 AM 4/11/2022     7:54 AM 6/14/2021     8:07 AM 7/1/2020     7:07 AM 6/13/2019     8:24 AM 3/8/2018     7:52 AM   TSH and Free T4   TSH 0.358 - 3.740 mIU/mL  4.240  3.200  4.410  3.810  3.610  4.62    Free T4 0.8 - 1.7 ng/dL 0.9    0.8  0.9           General Health      In the past six months, have you lost more than 10 pounds without trying?: 2 - No  Has your appetite been poor?: No  Type of Diet: Balanced  How does the patient maintain a good energy level?: Appropriate Exercise  How would you describe your daily physical activity?: Moderate  How would you describe your current health state?: Fair  How do you maintain positive mental well-being?: Social Interaction;Visiting Friends;Visiting Family  Have you had any immunizations at another office such as Influenza, Hepatitis B, Tetanus, or Pneumococcal?: No     Functional Ability     Bathing or Showering: Able without help  Toileting: Able without help  Dressing: Able without help  Eating: Able without help  Driving: Able without help  Preparing your meals: Able without help  Managing money/bills: Able without help  Taking medications as prescribed: Able without help  Are you able to afford your medications?: Yes  Hearing Problems?: No     Functional Status     Hearing Problems?: No  Vision Problems? : No  Difficulty  walking?: No  Difficulty dressing or bathing?: No  Problems with daily activities? : No  Memory Problems?: No      Fall/Risk Assessment                 Depression Screening (PHQ-2/PHQ-9): Over the LAST 2 WEEKS            Advance Directives     Do you have a healthcare power of ?: Yes  Do you have a living will?: Yes     Cognitive Assessment     What day of the week is this?: Correct  What month is it?: Correct  What year is it?: Correct  Recall \"Ball\": Correct  Recall \"Flag\": Correct  Recall \"Tree\": Correct         PREVENTATIVE SERVICES   INDICATIONS AND SCHEDULE Internal Lab or Procedure   Diabetes Screening     HbgA1C   Annually HEMOGLOBIN A1c (% of total Hgb)   Date Value   03/08/2018 5.3     HgbA1C (%)   Date Value   02/13/2023 5.7 (H)       Fasting Blood Sugar (FSB)Annually Glucose (mg/dL)   Date Value   02/13/2023 93     GLUCOSE (mg/dL)   Date Value   03/08/2018 91      Cardiovascular Disease Screening    LDL Annually LDL Cholesterol (mg/dL)   Date Value   02/13/2023 75     LDL-CHOLESTEROL (mg/dL (calc))   Date Value   03/08/2018 141 (H)       EKG - w/ Initial Preventative Physical Exam only, or if medically necessary yes   Colorectal Cancer Screening     Colonoscopy Screen every 10 years No recommendations at this time    Flex Sigmoidoscopy Screen every 5 years No results found for this or any previous visit.    Fecal Occult Blood Annually No results found for: \"FOB\", \"OCCULTSTOOL\"   Glaucoma Screening     Ophthalmology Visit Annually yes   Immunizations     Zoster (Not covered by Medicare Part B) No orders found for this or any previous visit.     SPECIFIC DISEASE MONITORING Internal Lab or Procedure   No disease specific diagnoses       ALLERGIES:     Allergies   Allergen Reactions    Celebrex [Celecoxib]      Headache       MEDICAL INFORMATION:   Past Medical History:    Abnormal stress test    Age-related cataract of both eyes    Arthritis    BCC (basal cell carcinoma)    Diverticulosis     Essential hypertension    Frequent urination    Hemorrhoids    Osteoporosis      Past Surgical History:   Procedure Laterality Date    Colonoscopy      Hip replacement surgery      Left     Hip replacement surgery      right     Other surgical history      Surgery for Basal Cell Kin Cancer      Tonsillectomy      Upper arm/elbow surgery unlisted      Broken arm      Family History   Problem Relation Age of Onset    Diabetes Father     Diabetes Mother     Other (Liver failure) Other         Sibling, Family histroy of     Ulcerative Colitis Son      Immunization History      Immunization History  Administered            Date(s) Administered    Covid-19 Vaccine Moderna 100 mcg/0.5 ml                          02/11/2021  03/12/2021  11/10/2021      Covid-19 Vaccine Moderna 50 Mcg/0.25 Ml                          04/08/2022      Covid-19 Vaccine Pfizer 30 mcg/0.3 ml                          02/11/2021 03/12/2021      Covid-19 Vaccine Pfizer Bivalent 30mcg/0.3mL                          09/12/2022      FLU VAC High Dose 65 YRS & Older PRSV Free (48397)                          10/12/2018  09/20/2021  09/12/2022                            10/10/2023      FLUAD High Dose 65 yr and older (63063)                          10/13/2018  08/13/2019      FLUZONE 6 months and older PFS 0.5 ml (92774)                          09/29/2020      Fluzone Vaccine Medicare ()                          10/12/2018      Pfizer Covid-19 Vaccine 30mcg/0.3ml 12yrs+ (8840-3747)                          10/16/2023      Pneumococcal (Prevnar 13)                          01/25/2018      Pneumovax 23          07/18/2020      Zoster Vaccine Recombinant Adjuvanted (Shingrix)                          05/23/2023 08/23/2023        SOCIAL HISTORY:   Social History     Socioeconomic History    Marital status:    Tobacco Use    Smoking status: Former     Current packs/day: 2.00     Types: Cigarettes    Smokeless tobacco: Never    Tobacco  comments:     >40 years   Vaping Use    Vaping status: Never Used   Substance and Sexual Activity    Alcohol use: Yes     Alcohol/week: 7.0 standard drinks of alcohol     Types: 7 Glasses of wine per week    Drug use: No    Sexual activity: Yes     Partners: Male   Other Topics Concern    Caffeine Concern No    Exercise Yes     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         REVIEW OF SYSTEMS:     General/Constitutional:   General able to do usual activities, good exercise tolerance, good general state of health, no fatigue, no fever, no weakness, no weight loss or gain .   HEENT/Neck:   Head  no dizziness, no lightheadedness. Eyes normal vision, no redness, no drainage. Ears no earaches, no fullness, normal hearing, no tinnitus. Nose and Sinuses no recurrent colds, no discharge, no itching, no hay fever, no nosebleeds, no sinus trouble. Mouth and Pharynx no sore throats, no hoarseness. Neck no lumps, no goiter, no neck stiffness or pain.   Endocrine:   Diabetes none. Thyroid disorder none.   Respiratory:   Patient denies chest pain, cough, MARTINEZ (dyspnea on exertion), chest congestion, blood-tinged sputum/wheezing. Breathing normal pattern .   Cardiovascular:   Patient denies chest pain, shortness of breath/rheumatic fever/murmur/dizzziness . Leg edema none. Orthopnea none. Palpitations none. PND (paroxsymal nocturnal dyspnea) none.   Gastrointestinal:   Patient denies abdominal pain, blood in stool, constipation, diarrhea, difficulty swallowing, change in stools, nausea, vomiting no weight changes noted no heart burn noted. Prolapsed rectum does have fecal smearing patient has to wear pads  Hematology:   Patient denies abnormal bleeding, easy bleeding, easy bruising. Enlarged lymph nodes none.   Women Only:   Patient denies breast pain.axillary nodes . Breast lumps or discharge none. Mammogram up-to-date yes -history of uterine prolapse see GYN uro-  Genitourinary:   Patient denies difficulty  urinating, frequent urination, hematuria. Dysuria none. Nocturia None. Urinary frequency no. Urinary incontinence no.   Musculoskeletal:   Arthritis No. Back pain no. Joint pain left knee pain .  Also has difficulty in using the left shoulder has a history of osteoarthritis joint stiffness no. Muscle weakness none.   Peripheral Vascular:   General no varicosities, no claudication.   Dermatologic:   Rash no. Sees skin md dr Polo   Neurologic:   Patient denies dizziness, fainting, loss of consciousness, weakness. Memory loss none. Tingling/numbness none. Trouble with balance none.    Psychiatric:   Patient denies depression, hallucinations, memory loss. Anxiety none. Insomnia none.      EXAM:   /60 (BP Location: Left arm, Patient Position: Sitting, Cuff Size: adult)   Pulse 72   Temp 97 °F (36.1 °C) (Temporal)   Resp 16   Ht 5' 6.5\" (1.689 m)   Wt 150 lb (68 kg)   SpO2 97%   BMI 23.85 kg/m²    >   BP Readings from Last 3 Encounters:   04/16/24 136/60   10/10/23 124/68   10/02/23 150/70     GENERAL:   Build: normal .   General Appearance: alert and oriented, pleasant.   HEENT:   Ear canals: normal.   EOM: within normal limit-conjunctiva normal.   Head: normocephalic.   Nasal septum: midline.   Nose: unremarkable.   Oral cavity: normal.   Pupils: normal, bilaterally .   Sclera: normal.   Turbinates: normal.   NECK:   Carotid bruit: none.   Cervical lymph nodes: unremarkable.   JVD: none.   Range of Motion: normal.   Thyroid: unremarkable.   HEART:   Clicks: absent .   Edema: none visible .   Heart sounds: normal.   Murmurs: none.   Rhythm: regular.   LUNGS:   Auscultation: clear .   Chest Shape: normal .   Percussion: normal.   Rales: no .   Respiratory effort: normal .   Rhonchi: no.   Wheezes: no.   ABDOMEN:   General: normal.   Hernia: absent.   Inguinal nodes: none.   Liver, Spleen: non-enlarged.   Rebound tenderness: absent.   Tenderness: absent .   EXTREMITIES:   Clubbing: none.   Cyanosis: absent .    Edema: none.   Pulses: present, bilateral.   Tremors: no.   Varicose veins: not present.   MUSCULOSKELETAL:   Cervical spines: normal.   L-S spines: normal.   Lower extremity joints: oa knees ,bunion r great toe with hammer toes noted .   Upper extremity joints: normal.  Except left and right shoulder movements are markedly diminished patient does not desire any work-up this is chronic  NEUROLOGICAL:   Babinski: negative/all reflexes are normal.   Cerebellar Testing grossly/intact: yes.   Cranial Nerves: CN's II-XII grossly intact.   Gait: normal.   Mental Status: Alert & oriented x 3.   Motor: power-normal/tone -normal/co-ordination normal/wasting -none/involuntary movements -none.   Reflexes: normal.   Sensory: normal sensation to all modalities.   LYMPHATICS:   Groin: no adenopathy .   Inguinal: no adenopathy.   Supraclavicular: none.   DERMATOLOGY:   Rash: no. Will see derm for mole ck Dr. Polo     BREASTS:   Appearance: symmetrical.   tenderness none.   Axilla: unremarkable, no palpable masses.   Breast Mass: no palpable masses.   Nipple Abnormality: none, no discharge.   Skin Change: unremarkable.       ASSESSMENT AND OTHER RELEVANT CHRONIC CONDITIONS:   Radha Sifuentes is a 83 year old female who presents for a Medicare Assessment.     PLAN SUMMARY:   Diagnoses and all orders for this visit:    Routine general medical examination at a health care facility  -     CBC With Differential With Platelet  -     Hemoglobin A1C  -     Assay, Thyroid Stim Hormone  -     Urinalysis, Routine  -     EKG 12 Lead performed at [facility]; Future    Essential hypertension  -     Comp Metabolic Panel (14)    History of skin cancer per dermatology    Incontinence of feces, unspecified fecal incontinence type  -     Surgery Referral - In Network    Primary osteoarthritis of left shoulder no change    Prolapsed uterus per GYN    Pure hypercholesterolemia stable pending labs  -     Lipid Panel    Senile osteoporosis  -      XR DEXA BONE DENSITOMETRY (CPT=77080); Future  -     VITAMIN D, 25-HYDROXY [55212][Q]       The patient indicates understanding of these issues and agrees to the plan.  The patient is asked to return in 4 weeks  for f/u   Diet counseling perfomed    SUGGESTED VACCINATIONS - Influenza, Pneumococcal, Zoster, Tetanus   Influenza: No recommendations at this time  Pneumonia: No recommendations at this time

## 2024-05-06 ENCOUNTER — TELEPHONE (OUTPATIENT)
Dept: INTERNAL MEDICINE CLINIC | Facility: CLINIC | Age: 84
End: 2024-05-06

## 2024-05-06 DIAGNOSIS — Z85.828 HISTORY OF SKIN CANCER: Primary | ICD-10-CM

## 2024-05-06 NOTE — TELEPHONE ENCOUNTER
Referral needed     Dr. Hayden Polo  [facility]     Quantity  6 Visits  Diagnosis Code 1   - Unspecified malignant neoplasm of skin unspecified     Procedure Code 1 (CPT/HCPCS)  01778 - UNLISTED E & M SERVICE    *[employer] authorization needed also

## 2024-05-07 NOTE — TELEPHONE ENCOUNTER
Referral to [facility], Dr. Polo placed per protocol.   Referral is in open status.   Monitor for AUTH.

## 2024-05-09 NOTE — TELEPHONE ENCOUNTER
Referral to [facility] is authorized.   Faxed all docs to #[phone] and received confirmation page.     [phone] left detailed message that referral to [facility] is authorized and everything has been faxed over to them. Left cb # if pt has any questions.

## 2024-05-10 NOTE — TELEPHONE ENCOUNTER
Fax to [facility] failed.   Refaxed documents to #[phone] and received confirmation.

## 2024-05-21 ENCOUNTER — APPOINTMENT (OUTPATIENT)
Dept: URBAN - METROPOLITAN AREA CLINIC 242 | Age: 84
Setting detail: DERMATOLOGY
End: 2024-05-21

## 2024-05-21 DIAGNOSIS — D18.0 HEMANGIOMA: ICD-10-CM

## 2024-05-21 DIAGNOSIS — D22 MELANOCYTIC NEVI: ICD-10-CM

## 2024-05-21 DIAGNOSIS — L82.1 OTHER SEBORRHEIC KERATOSIS: ICD-10-CM

## 2024-05-21 DIAGNOSIS — L21.8 OTHER SEBORRHEIC DERMATITIS: ICD-10-CM

## 2024-05-21 PROBLEM — D18.01 HEMANGIOMA OF SKIN AND SUBCUTANEOUS TISSUE: Status: ACTIVE | Noted: 2024-05-21

## 2024-05-21 PROBLEM — D22.5 MELANOCYTIC NEVI OF TRUNK: Status: ACTIVE | Noted: 2024-05-21

## 2024-05-21 PROCEDURE — 99213 OFFICE O/P EST LOW 20 MIN: CPT

## 2024-05-21 PROCEDURE — OTHER COUNSELING: OTHER

## 2024-05-21 PROCEDURE — OTHER MIPS QUALITY: OTHER

## 2024-05-21 PROCEDURE — OTHER PRESCRIPTION MEDICATION MANAGEMENT: OTHER

## 2024-05-21 PROCEDURE — OTHER OBSERVATION: OTHER

## 2024-05-21 PROCEDURE — OTHER OBSERVATION AND MEASURE: OTHER

## 2024-05-21 ASSESSMENT — LOCATION SIMPLE DESCRIPTION DERM
LOCATION SIMPLE: UPPER BACK
LOCATION SIMPLE: ANTERIOR SCALP
LOCATION SIMPLE: RIGHT UPPER BACK
LOCATION SIMPLE: LEFT ANTERIOR NECK
LOCATION SIMPLE: CHEST

## 2024-05-21 ASSESSMENT — LOCATION ZONE DERM
LOCATION ZONE: NECK
LOCATION ZONE: SCALP
LOCATION ZONE: TRUNK

## 2024-05-21 ASSESSMENT — LOCATION DETAILED DESCRIPTION DERM
LOCATION DETAILED: LEFT INFERIOR ANTERIOR NECK
LOCATION DETAILED: MIDDLE STERNUM
LOCATION DETAILED: RIGHT SUPERIOR UPPER BACK
LOCATION DETAILED: MID-FRONTAL SCALP
LOCATION DETAILED: SUPERIOR THORACIC SPINE

## 2024-05-21 NOTE — PROCEDURE: PRESCRIPTION MEDICATION MANAGEMENT
Render In Strict Bullet Format?: No
Initiate Treatment: Ketoconazole shampoo 2% QD PRN
Detail Level: Zone

## 2024-05-21 NOTE — PROCEDURE: MIPS QUALITY
Quality 47: Advance Care Plan: Advance care planning not documented, reason not otherwise specified.
Quality 358: Patient-Centered Surgical Risk Assessment And Communication: A patient-specific risk assessment with a risk calculator was not completed or communicated to patient and/or family.
Quality 226: Preventive Care And Screening: Tobacco Use: Screening And Cessation Intervention: Patient screened for tobacco use and is an ex/non-smoker
Detail Level: Detailed

## 2024-05-24 DIAGNOSIS — E78.00 PURE HYPERCHOLESTEROLEMIA: Chronic | ICD-10-CM

## 2024-05-24 DIAGNOSIS — I10 ESSENTIAL HYPERTENSION: ICD-10-CM

## 2024-05-24 RX ORDER — PRAVASTATIN SODIUM 20 MG
TABLET ORAL
Qty: 90 TABLET | Refills: 3 | Status: SHIPPED | OUTPATIENT
Start: 2024-05-24

## 2024-05-24 RX ORDER — AMLODIPINE BESYLATE AND BENAZEPRIL HYDROCHLORIDE 5; 10 MG/1; MG/1
CAPSULE ORAL
Qty: 90 CAPSULE | Refills: 3 | Status: SHIPPED | OUTPATIENT
Start: 2024-05-24

## 2024-05-24 NOTE — TELEPHONE ENCOUNTER
Name from pharmacy: AMLODIPINE BESYLATE/BENAZEPRIL HYDROCHLORIDE 5-10 MG Capsule         Will file in chart as: AMLODIPINE BESY-BENAZEPRIL HCL 5-10 MG Oral Cap    Sig: TAKE 1 CAPSULE EVERY DAY    Disp: 90 capsule    Refills: 3    Start: 5/24/2024    Class: Normal    Non-formulary For: Essential hypertension    Last ordered: 1 year ago (5/10/2023) by Rm Tuttle MD    Last refill: 3/13/2024    Rx #: [id number]    Hypertension Medications Protocol [id number]/24/2024 02:08 AM   Protocol Details CMP or BMP in past 12 months    EGFRCR or GFRNAA > 50    Last BP reading less than 140/90    In person appointment or virtual visit in the past 12 mos or appointment in next 3 mos       Name from pharmacy: PRAVASTATIN SODIUM 20 MG Tablet         Will file in chart as: PRAVASTATIN 20 MG Oral Tab    Sig: TAKE 1 TABLET EVERY NIGHT    Disp: 90 tablet    Refills: 3    Start: 5/24/2024    Class: Normal    Non-formulary For: Pure hypercholesterolemia    Last ordered: 1 year ago (3/23/2023) by Rm Tuttle MD    Last refill: 3/13/2024    Rx #: [id number]    Cholesterol Medication Protocol [id number]/24/2024 02:08 AM   Protocol Details ALT < 80    ALT resulted within past year    Lipid panel within past 12 months    In person appointment or virtual visit in the past 12 mos or appointment in next 3 mos      To be filled at: [facility] - [facility] [address] [facility] [phone], [phone]

## 2024-06-10 ENCOUNTER — OFFICE VISIT (OUTPATIENT)
Facility: LOCATION | Age: 84
End: 2024-06-10
Payer: MEDICARE

## 2024-06-10 VITALS — OXYGEN SATURATION: 97 % | HEART RATE: 67 BPM | TEMPERATURE: 98 F

## 2024-06-10 DIAGNOSIS — R15.9 FULL INCONTINENCE OF FECES: Primary | ICD-10-CM

## 2024-06-10 PROCEDURE — 1170F FXNL STATUS ASSESSED: CPT | Performed by: STUDENT IN AN ORGANIZED HEALTH CARE EDUCATION/TRAINING PROGRAM

## 2024-06-10 PROCEDURE — 99203 OFFICE O/P NEW LOW 30 MIN: CPT | Performed by: STUDENT IN AN ORGANIZED HEALTH CARE EDUCATION/TRAINING PROGRAM

## 2024-06-10 PROCEDURE — 1159F MED LIST DOCD IN RCRD: CPT | Performed by: STUDENT IN AN ORGANIZED HEALTH CARE EDUCATION/TRAINING PROGRAM

## 2024-06-10 PROCEDURE — 1160F RVW MEDS BY RX/DR IN RCRD: CPT | Performed by: STUDENT IN AN ORGANIZED HEALTH CARE EDUCATION/TRAINING PROGRAM

## 2024-06-10 PROCEDURE — 46600 DIAGNOSTIC ANOSCOPY SPX: CPT | Performed by: STUDENT IN AN ORGANIZED HEALTH CARE EDUCATION/TRAINING PROGRAM

## 2024-06-11 NOTE — H&P
New Patient Visit Note       Active Problems      1. Full incontinence of feces        Chief Complaint   Chief Complaint   Patient presents with    New Patient     NP - Incontinence of feces, accidents in bed, accidents while out, patient wears a pad, no other symptoms.       History of Present Illness   This is a very nice 83-year-old female who was referred to me for evaluation of fecal incontinence.  Patient has had episodes of fecal incontinence for at least 2 years now.  She typically has around 1 incontinence episode day where she leaks solid stool into her undergarments.  She wears a pad to control the leakage.  She generally has around 1 bowel movement a day in the morning.  She currently takes fiber daily.  She does not take any antidiarrheals.  Patient states her stool consistency varies depending on what she eats.  She states that time she is constipated and at times the stool is loose.  Patient lives independently.  She states the fecal incontinence currently is not interfering with her quality of life because she is able to control her symptoms by wearing a pad.  She has never tried pelvic floor physical therapy.    Patient has had 1 vaginal delivery and does not recall having an episiotomy or any severe obstetric injury.  No prior anorectal surgery.  She denies any urinary incontinence.  She denies any rectal pain or prolapse.  She does endorse uterine prolapse.  She has rare rectal bleeding that she attributes to hemorrhoids.  Her most recent colonoscopy was performed by Dr. Bell in 2018 with findings of diverticulosis and internal hemorrhoids.  He did not recommend any further screening colonoscopies.      Allergies  Radha is allergic to celebrex [celecoxib].    Past Medical / Surgical / Social / Family History    The past medical and past surgical history have been reviewed by me today.    Past Medical History:    Abnormal stress test    Age-related cataract of both eyes    Arthritis    BCC (basal  cell carcinoma)    Diverticulosis    Essential hypertension    Frequent urination    Hemorrhoids    Osteoporosis     Past Surgical History:   Procedure Laterality Date    Colonoscopy      Hip replacement surgery      Left     Hip replacement surgery      right     Other surgical history      Surgery for Basal Cell Kin Cancer      Tonsillectomy      Upper arm/elbow surgery unlisted      Broken arm       The family history and social history have been reviewed by me today.    Family History   Problem Relation Age of Onset    Diabetes Father     Diabetes Mother     Other (Liver failure) Other         Sibling, Family histroy of     Ulcerative Colitis Son      Social History     Socioeconomic History    Marital status:    Tobacco Use    Smoking status: Former     Current packs/day: 2.00     Types: Cigarettes    Smokeless tobacco: Never    Tobacco comments:     >40 years   Vaping Use    Vaping status: Never Used   Substance and Sexual Activity    Alcohol use: Yes     Alcohol/week: 7.0 standard drinks of alcohol     Types: 7 Glasses of wine per week    Drug use: No    Sexual activity: Yes     Partners: Male   Other Topics Concern    Caffeine Concern No    Exercise Yes     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         Current Outpatient Medications:     AMLODIPINE BESY-BENAZEPRIL HCL 5-10 MG Oral Cap, TAKE 1 CAPSULE EVERY DAY, Disp: 90 capsule, Rfl: 3    PRAVASTATIN 20 MG Oral Tab, TAKE 1 TABLET EVERY NIGHT, Disp: 90 tablet, Rfl: 3    Glucosamine 750 MG Oral Tab, Take 2 tablets by mouth daily., Disp: , Rfl:     Multiple Vitamin (MULTI-VITAMIN DAILY) Oral Tab, Take 1 tablet by mouth daily., Disp: , Rfl:     Acetaminophen (TYLENOL EXTRA STRENGTH OR), Take 1-4 tablets by mouth daily., Disp: , Rfl:     Calcium Carbonate-Vitamin D 500-200 MG-UNIT Oral Tab, Take 1 tablet by mouth 2 (two) times daily. Take with food, Disp: , Rfl:     Cholecalciferol (VITAMIN D) 2000 UNITS Oral Cap, Take 1 tablet by  mouth daily., Disp: , Rfl:       Review of Systems  A 10 point review of systems was performed and negative unless otherwise documented per HPI.    Physical Findings   Pulse 67   Temp 98.1 °F (36.7 °C) (Temporal)   SpO2 97%   Physical Exam  Vitals and nursing note reviewed. Exam conducted with a chaperone present.   Constitutional:       General: She is not in acute distress.  HENT:      Head: Normocephalic and atraumatic.      Mouth/Throat:      Mouth: Mucous membranes are moist.   Cardiovascular:      Rate and Rhythm: Normal rate and regular rhythm.   Pulmonary:      Effort: Pulmonary effort is normal.   Abdominal:      General: There is no distension.      Palpations: Abdomen is soft.      Tenderness: There is no abdominal tenderness.   Genitourinary:     Comments: Patient examined in the prone jackknife position with female medical assistant chaperone present.  External exam of the anus is normal.  Digital rectal exam shows poor tone and patient was not able to elicit any squeeze pressure.  There are no masses, bleeding, drainage or tenderness.  Anoscopy reveals redundant, prolapsing distal rectal mucosa that appears pink and healthy.  Musculoskeletal:         General: No deformity.   Skin:     General: Skin is warm and dry.   Neurological:      General: No focal deficit present.      Mental Status: She is alert.   Psychiatric:         Mood and Affect: Mood normal.             Assessment   1. Full incontinence of feces        This is a very nice 83-year-old female who was referred to me for evaluation of fecal incontinence.  Patient has had episodes of fecal incontinence for at least 2 years now.  She typically has around 1 incontinence episode day where she leaks solid stool into her undergarments.  She wears a pad to control the leakage.  She generally has around 1 bowel movement a day in the morning.  She currently takes fiber daily.  She does not take any antidiarrheals.  Patient states her stool  consistency varies depending on what she eats.  She states that time she is constipated and at times the stool is loose.  Patient lives independently.  She states the fecal incontinence currently is not interfering with her quality of life because she is able to control her symptoms by wearing a pad.  She has never tried pelvic floor physical therapy.    Patient has had 1 vaginal delivery and does not recall having an episiotomy or any severe obstetric injury.  No prior anorectal surgery.  She denies any urinary incontinence.  She denies any rectal pain or prolapse.  She does endorse uterine prolapse.  She has rare rectal bleeding that she attributes to hemorrhoids.  Her most recent colonoscopy was performed by Dr. Bell in 2018 with findings of diverticulosis and internal hemorrhoids.  He did not recommend any further screening colonoscopies.    External exam of the anus is normal.  Digital rectal exam shows poor tone and patient was not able to elicit any squeeze pressure.  There are no masses, bleeding, drainage or tenderness.  Anoscopy reveals redundant, prolapsing distal rectal mucosa that appears pink and healthy.    Plan  Nonoperative treatment options for fecal incontinence discussed with patient and generally include fiber supplementation, antidiarrheals as needed and pelvic floor physical therapy.  Patient is already taking a daily fiber supplement.  We discussed possibly trying to increase frequency to twice a day to see if this will improve her incontinence symptoms.  I also strongly encouraged her to consider pelvic floor physical therapy.  Patient states she is quite busy during the week but will consider this.  She did take a handout on the pelvic floor physical therapy department available through [facility].    Surgical treatment options for fecal incontinence are typically reserved for patients who have life-limiting symptoms despite maximal nonoperative therapy.  These would include sacral  neuromodulation, sphincteroplasty and colostomy creation.  At this point, patient was not interested in considering any surgical treatment options given the fact that her incontinence is not affecting her quality of life.  This is very reasonable.  Patient should follow-up with me as needed if her incontinence episodes worsen or begin to affect her quality of life.  Patient expressed understanding and was agreeable to plan.     No orders of the defined types were placed in this encounter.      Imaging & Referrals   PELVIC FLOOR THERAPY - INTERNAL    Follow Up  No follow-ups on file.    Alexsander Lima MD

## 2024-07-16 ENCOUNTER — TELEPHONE (OUTPATIENT)
Dept: INTERNAL MEDICINE CLINIC | Facility: CLINIC | Age: 84
End: 2024-07-16

## 2024-07-16 DIAGNOSIS — G89.29 CHRONIC PAIN OF TOE OF RIGHT FOOT: ICD-10-CM

## 2024-07-16 DIAGNOSIS — M79.674 CHRONIC PAIN OF TOE OF RIGHT FOOT: ICD-10-CM

## 2024-07-16 DIAGNOSIS — I10 ESSENTIAL HYPERTENSION: Primary | ICD-10-CM

## 2024-07-16 NOTE — TELEPHONE ENCOUNTER
Podiatry Referral     [facility]   [address] [place] [address] [phone]  [facility] [id number]  Ph: [phone]    Pt has a F/U apt the first week of August.

## 2024-07-17 NOTE — TELEPHONE ENCOUNTER
Patient requesting referral to podiatry. Patient complaint of pain to left foot.     Last OV: 4/16/24    Podiatry referral pended, please sign if appropriate. Thank you.

## 2024-07-18 ENCOUNTER — HOSPITAL ENCOUNTER (OUTPATIENT)
Dept: BONE DENSITY | Age: 84
Discharge: HOME | End: 2024-07-18
Attending: INTERNAL MEDICINE
Payer: MEDICARE

## 2024-07-18 DIAGNOSIS — M81.0 SENILE OSTEOPOROSIS: Chronic | ICD-10-CM

## 2024-07-18 PROCEDURE — 77080 DXA BONE DENSITY AXIAL: CPT | Performed by: INTERNAL MEDICINE

## 2024-07-19 NOTE — TELEPHONE ENCOUNTER
Referral authorized and faxed:     [address] [place] [address]  [id number]  [facility] [id number]  Phone: [phone]  Fax: [phone]

## 2024-07-25 ENCOUNTER — TELEPHONE (OUTPATIENT)
Dept: INTERNAL MEDICINE CLINIC | Facility: CLINIC | Age: 84
End: 2024-07-25

## 2024-07-25 NOTE — TELEPHONE ENCOUNTER
Per RN pcp would like to DB pt to discuss lab work results.     Called patient twice and no vm set up / phone just kept ringing.     Contacted patient to both phone number on file.     If patient calls back please schedule telephone visit. Ok to DB per RN and PCP.

## 2024-07-29 NOTE — TELEPHONE ENCOUNTER
Future Appointments   Date Time Provider Department Center   7/30/2024  1:45 PM Rm Tuttle MD [facility] [place]

## 2024-07-30 ENCOUNTER — VIRTUAL PHONE E/M (OUTPATIENT)
Dept: INTERNAL MEDICINE CLINIC | Facility: CLINIC | Age: 84
End: 2024-07-30
Payer: MEDICARE

## 2024-07-30 DIAGNOSIS — M81.0 SENILE OSTEOPOROSIS: Primary | ICD-10-CM

## 2024-07-30 PROCEDURE — 99212 OFFICE O/P EST SF 10 MIN: CPT | Performed by: INTERNAL MEDICINE

## 2024-07-30 RX ORDER — ALENDRONATE SODIUM 70 MG/1
70 TABLET ORAL WEEKLY
Qty: 12 TABLET | Refills: 3 | Status: SHIPPED | OUTPATIENT
Start: 2024-07-30 | End: 2025-07-30

## 2024-07-30 NOTE — PROGRESS NOTES
Virtual Telephone Check-In    Radha Sifuentes verbally consents to a Virtual/Telephone Check-In visit on 07/30/24.  Patient has been referred to the Counts include 234 beds at the [facility] website at [email or web address] to review the yearly Consent to Treat document.    Patient understands and accepts financial responsibility for any deductible, co-insurance and/or co-pays associated with this service.    Duration of the service: 11 minutes      Summary of topics discussed: Scan discussed with patient.  Patient agreeable to trying bisphosphonate.  Will continue to do weightbearing exercises vitamin D and calcium repeat DEXA scan in 1 year.      Diagnoses and all orders for this visit:    Senile osteoporosis  -     alendronate (FOSAMAX) 70 MG Oral Tab; Take 1 tablet (70 mg total) by mouth once a week.          Rm Tuttle MD

## 2024-08-20 ENCOUNTER — TELEPHONE (OUTPATIENT)
Dept: INTERNAL MEDICINE CLINIC | Facility: CLINIC | Age: 84
End: 2024-08-20

## 2024-08-20 NOTE — TELEPHONE ENCOUNTER
[facility]  [facility]#   Fax# [phone]     Office calling on requesting the [employer] Authorization for patient referral from 07/18/2024  to be faxed please.

## 2024-09-30 ENCOUNTER — TELEPHONE (OUTPATIENT)
Dept: INTERNAL MEDICINE CLINIC | Facility: CLINIC | Age: 84
End: 2024-09-30

## 2024-09-30 DIAGNOSIS — G89.29 CHRONIC PAIN OF TOE OF RIGHT FOOT: Primary | ICD-10-CM

## 2024-09-30 DIAGNOSIS — M79.674 CHRONIC PAIN OF TOE OF RIGHT FOOT: Primary | ICD-10-CM

## 2024-09-30 DIAGNOSIS — M81.0 SENILE OSTEOPOROSIS: ICD-10-CM

## 2024-09-30 RX ORDER — ALENDRONATE SODIUM 70 MG/1
70 TABLET ORAL WEEKLY
Qty: 12 TABLET | Refills: 3 | Status: SHIPPED | OUTPATIENT
Start: 2024-09-30 | End: 2025-09-30

## 2024-09-30 NOTE — TELEPHONE ENCOUNTER
Requesting refill for: alendronate (FOSAMAX) 70 MG Oral Tab     Pharmacy: [facility]# [phone]

## 2024-09-30 NOTE — TELEPHONE ENCOUNTER
Is insurance accurate in Epic and Verified: YES  Is there already an open/pending/approved referral: NO    Specialty: PODIATRY   Refer to Provider (Physician's first and last name - referral needs to be under collaborating MD's name):   [facility]  [address]       Diagnosis or reason they are being seen: Chronic pain of toe of right foot (M79.674,G89.29)    If Requesting Out of Network Provider, please provide reason: NO     Patient Call Back Number:

## 2024-12-02 ENCOUNTER — TELEPHONE (OUTPATIENT)
Dept: INTERNAL MEDICINE CLINIC | Facility: CLINIC | Age: 84
End: 2024-12-02

## 2024-12-02 DIAGNOSIS — Z12.31 BREAST CANCER SCREENING BY MAMMOGRAM: Primary | ICD-10-CM

## 2025-01-04 ENCOUNTER — HOSPITAL ENCOUNTER (OUTPATIENT)
Dept: MAMMOGRAPHY | Facility: HOSPITAL | Age: 85
Discharge: HOME | End: 2025-01-04
Attending: INTERNAL MEDICINE
Payer: MEDICARE

## 2025-01-04 DIAGNOSIS — Z12.31 BREAST CANCER SCREENING BY MAMMOGRAM: ICD-10-CM

## 2025-01-04 PROCEDURE — 77063 BREAST TOMOSYNTHESIS BI: CPT | Performed by: INTERNAL MEDICINE

## 2025-01-04 PROCEDURE — 77067 SCR MAMMO BI INCL CAD: CPT | Performed by: INTERNAL MEDICINE

## 2025-01-23 ENCOUNTER — TELEPHONE (OUTPATIENT)
Dept: INTERNAL MEDICINE CLINIC | Facility: CLINIC | Age: 85
End: 2025-01-23

## 2025-02-24 ENCOUNTER — OFFICE VISIT (OUTPATIENT)
Dept: INTERNAL MEDICINE CLINIC | Facility: CLINIC | Age: 85
End: 2025-02-24
Payer: MEDICARE

## 2025-02-24 VITALS
HEIGHT: 66.5 IN | BODY MASS INDEX: 22.71 KG/M2 | HEART RATE: 71 BPM | RESPIRATION RATE: 16 BRPM | DIASTOLIC BLOOD PRESSURE: 60 MMHG | OXYGEN SATURATION: 96 % | WEIGHT: 143 LBS | TEMPERATURE: 97 F | SYSTOLIC BLOOD PRESSURE: 138 MMHG

## 2025-02-24 DIAGNOSIS — M19.012 PRIMARY OSTEOARTHRITIS OF LEFT SHOULDER: ICD-10-CM

## 2025-02-24 DIAGNOSIS — M81.0 SENILE OSTEOPOROSIS: Chronic | ICD-10-CM

## 2025-02-24 DIAGNOSIS — Z85.828 HISTORY OF SKIN CANCER: Chronic | ICD-10-CM

## 2025-02-24 DIAGNOSIS — I10 ESSENTIAL HYPERTENSION: Chronic | ICD-10-CM

## 2025-02-24 DIAGNOSIS — E78.00 PURE HYPERCHOLESTEROLEMIA: Chronic | ICD-10-CM

## 2025-02-24 DIAGNOSIS — N81.4 PROLAPSED UTERUS: Chronic | ICD-10-CM

## 2025-02-24 DIAGNOSIS — Z00.00 ROUTINE GENERAL MEDICAL EXAMINATION AT A HEALTH CARE FACILITY: Primary | ICD-10-CM

## 2025-02-24 PROBLEM — R15.9 INCONTINENCE OF FECES: Chronic | Status: RESOLVED | Noted: 2021-04-30 | Resolved: 2025-02-24

## 2025-02-24 NOTE — PROGRESS NOTES
REASON FOR VISIT:    Radha Sifuentes is a 84 year old female who presents for a MA Supervisit.    Female      Patient Care Team: Patient Care Team:  Rm Tuttle MD as PCP - General (Internal Medicine)  Luis Armando Fiath MD (OPHTHALMOLOGY)  Juan Alberto Cornelius, TOMMY as Physical Therapist (Physical Therapy)  Justin Mcpherson, PT as Physical Therapist    Patient Active Problem List   Diagnosis    Senile osteoporosis    Pure hypercholesterolemia    Essential hypertension    Prolapsed uterus    History of skin cancer    Primary osteoarthritis of left shoulder     Current Outpatient Medications   Medication Sig Dispense Refill    alendronate (FOSAMAX) 70 MG Oral Tab Take 1 tablet (70 mg total) by mouth once a week. 12 tablet 3    AMLODIPINE BESY-BENAZEPRIL HCL 5-10 MG Oral Cap TAKE 1 CAPSULE EVERY DAY 90 capsule 3    PRAVASTATIN 20 MG Oral Tab TAKE 1 TABLET EVERY NIGHT 90 tablet 3    Glucosamine 750 MG Oral Tab Take 2 tablets by mouth daily.      Multiple Vitamin (MULTI-VITAMIN DAILY) Oral Tab Take 1 tablet by mouth daily.      Acetaminophen (TYLENOL EXTRA STRENGTH OR) Take 1-4 tablets by mouth daily.      Calcium Carbonate-Vitamin D 500-200 MG-UNIT Oral Tab Take 1 tablet by mouth 2 (two) times daily. Take with food      Cholecalciferol (VITAMIN D) 2000 UNITS Oral Cap Take 1 tablet by mouth daily.             Latest Ref Rng & Units 2/13/2023     7:56 AM 4/11/2022     7:54 AM 6/14/2021     8:07 AM 7/1/2020     7:07 AM 1/16/2020     8:11 AM 6/13/2019     8:24 AM 3/8/2018     7:52 AM   Glucose and HbA1c   Glucose 70 - 99 mg/dL 93  95  92  89  86  92  91          Latest Ref Rng & Units 2/13/2023     7:56 AM 4/11/2022     7:54 AM 6/14/2021     8:07 AM 7/1/2020     7:07 AM 1/16/2020     8:11 AM 8/1/2019     8:30 AM 6/13/2019     8:24 AM   Cholesterol   Total Cholesterol <200 mg/dL 146  161  160  166  181  144  212    Triglycerides 30 - 149 mg/dL 125  131  172  175  150  138  195    HDL 40 - 59 mg/dL 49  51  43  41  46  40  44     LDL <100 mg/dL 75  87  87  90  105  76  129          Latest Ref Rng & Units 2/13/2023     7:56 AM 4/11/2022     7:54 AM 6/14/2021     8:07 AM 7/1/2020     7:07 AM 1/16/2020     8:11 AM 6/13/2019     8:24 AM 3/8/2018     7:52 AM   BUN and Cr   BUN 7 - 18 mg/dL 11  9  10  12  14  15  12    Creatinine 0.55 - 1.02 mg/dL 0.68  0.66  0.75  0.85  0.70  0.81  0.92          Latest Ref Rng & Units 2/13/2023     7:56 AM 4/11/2022     7:54 AM 6/14/2021     8:07 AM 7/1/2020     7:07 AM 1/16/2020     8:11 AM 8/1/2019     8:30 AM 6/13/2019     8:24 AM   AST and ALT   AST (SGOT) 15 - 37 U/L 24  23  22  19  20  17  19    ALT (SGPT) 13 - 56 U/L 27  28  22  23  27  21  24          Latest Ref Rng & Units 2/27/2023     8:21 AM 2/13/2023     7:56 AM 4/11/2022     7:54 AM 6/14/2021     8:07 AM 7/1/2020     7:07 AM 6/13/2019     8:24 AM 3/8/2018     7:52 AM   TSH and Free T4   TSH 0.358 - 3.740 mIU/mL  4.240  3.200  4.410  3.810  3.610  4.62    Free T4 0.8 - 1.7 ng/dL 0.9    0.8  0.9           General Health      In the past six months, have you lost more than 10 pounds without trying?: 2 - No  Has your appetite been poor?: No  Type of Diet: Balanced  How does the patient maintain a good energy level?: Appropriate Exercise  How would you describe your daily physical activity?: Moderate  How would you describe your current health state?: Good  How do you maintain positive mental well-being?: Social Interaction;Visiting Family;Visiting Friends  Have you had any immunizations at another office such as Influenza, Hepatitis B, Tetanus, or Pneumococcal?: No     Functional Ability     Bathing or Showering: Able without help  Toileting: Able without help  Dressing: Able without help  Eating: Able without help  Driving: Able without help  Preparing your meals: Able without help  Managing money/bills: Able without help  Taking medications as prescribed: Able without help  Are you able to afford your medications?: Yes  Hearing Problems?: No      Functional Status     Hearing Problems?: No  Vision Problems? : No  Difficulty walking?: No  Difficulty dressing or bathing?: No  Problems with daily activities? : No  Memory Problems?: No      Fall/Risk Assessment                 Depression Screening (PHQ-2/PHQ-9): Over the LAST 2 WEEKS            Advance Directives     Do you have a healthcare power of ?: Yes  Do you have a living will?: Yes     Cognitive Assessment     What day of the week is this?: Correct  What month is it?: Correct  What year is it?: Correct  Recall \"Ball\": Correct  Recall \"Flag\": Correct  Recall \"Tree\": Correct         PREVENTATIVE SERVICES   INDICATIONS AND SCHEDULE Internal Lab or Procedure   Diabetes Screening     HbgA1C   Annually HEMOGLOBIN A1c (% of total Hgb)   Date Value   03/08/2018 5.3     HgbA1C (%)   Date Value   02/13/2023 5.7 (H)       Fasting Blood Sugar (FSB)Annually Glucose (mg/dL)   Date Value   02/13/2023 93     GLUCOSE (mg/dL)   Date Value   03/08/2018 91      Cardiovascular Disease Screening    LDL Annually LDL Cholesterol (mg/dL)   Date Value   02/13/2023 75     LDL-CHOLESTEROL (mg/dL (calc))   Date Value   03/08/2018 141 (H)       EKG - w/ Initial Preventative Physical Exam only, or if medically necessary yes   Colorectal Cancer Screening     Colonoscopy Screen every 10 years No recommendations at this time    Flex Sigmoidoscopy Screen every 5 years No results found for this or any previous visit.    Fecal Occult Blood Annually No results found for: \"FOB\", \"OCCULTSTOOL\"   Glaucoma Screening     Ophthalmology Visit Annually yes   Immunizations     Zoster (Not covered by Medicare Part B) No orders found for this or any previous visit.     SPECIFIC DISEASE MONITORING Internal Lab or Procedure   No disease specific diagnoses       ALLERGIES:   Allergies[1]  MEDICAL INFORMATION:   Past Medical History:    Abnormal stress test    Age-related cataract of both eyes    Arthritis    BCC (basal cell carcinoma)     Diverticulosis    Essential hypertension    Frequent urination    Hemorrhoids    Osteoporosis      Past Surgical History:   Procedure Laterality Date    Colonoscopy      Hip replacement surgery      Left     Hip replacement surgery      right     Other surgical history      Surgery for Basal Cell Kin Cancer      Tonsillectomy      Upper arm/elbow surgery unlisted      Broken arm      Family History   Problem Relation Age of Onset    Diabetes Father     Diabetes Mother     Other (Liver failure) Other         Sibling, Family histroy of     Ulcerative Colitis Son      Immunization History      Immunization History  Administered            Date(s) Administered    Covid-19 Vaccine Moderna 100 mcg/0.5 ml                          02/11/2021  03/12/2021  11/10/2021      Covid-19 Vaccine Moderna 50 Mcg/0.25 Ml                          04/08/2022      Covid-19 Vaccine Pfizer 30 mcg/0.3 ml                          02/11/2021 03/12/2021      Covid-19 Vaccine Pfizer Bivalent 30mcg/0.3mL                          09/12/2022      FLU VAC High Dose 65 YRS & Older PRSV Free (67632)                          10/12/2018  09/20/2021  09/12/2022                            10/10/2023      FLUAD High Dose 65 yr and older (56652)                          10/13/2018  08/13/2019      FLUZONE 6 months and older PFS 0.5 ml (38166)                          09/29/2020      Fluzone Vaccine Medicare ()                          10/12/2018      High Dose Fluzone Influenza Vaccine, 65yr+ PF 0.5mL (88271)                          09/06/2024      Moderna Covid-19 Vaccine 50mcg/0.5ml 12yrs+                          09/06/2024      Pfizer Covid-19 Vaccine 30mcg/0.3ml 12yrs+                          10/16/2023      Pneumococcal (Prevnar 13)                          01/25/2018      Pneumovax 23          07/18/2020      Zoster Vaccine Recombinant Adjuvanted (Shingrix)                          05/23/2023 08/23/2023        SOCIAL HISTORY:   Social  History     Socioeconomic History    Marital status:    Tobacco Use    Smoking status: Former     Current packs/day: 2.00     Types: Cigarettes    Smokeless tobacco: Never    Tobacco comments:     >40 years   Vaping Use    Vaping status: Never Used   Substance and Sexual Activity    Alcohol use: Yes     Alcohol/week: 7.0 standard drinks of alcohol     Types: 7 Glasses of wine per week    Drug use: No    Sexual activity: Yes     Partners: Male   Other Topics Concern    Caffeine Concern No    Exercise Yes     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         REVIEW OF SYSTEMS:     General/Constitutional:   General able to do usual activities, good exercise tolerance, good general state of health, no fatigue, no fever, no weakness, no weight loss or gain .   HEENT/Neck:   Head  no dizziness, no lightheadedness. Eyes normal vision, no redness, no drainage. Ears no earaches, no fullness, normal hearing, no tinnitus. Nose and Sinuses no recurrent colds, no discharge, no itching, no hay fever, no nosebleeds, no sinus trouble. Mouth and Pharynx no sore throats, no hoarseness. Neck no lumps, no goiter, no neck stiffness or pain.   Endocrine:   Diabetes none. Thyroid disorder none.   Respiratory:   Patient denies chest pain, cough, MARTINEZ (dyspnea on exertion), chest congestion, blood-tinged sputum/wheezing. Breathing normal pattern .   Cardiovascular:   Patient denies chest pain, shortness of breath/rheumatic fever/murmur/dizzziness . Leg edema none. Orthopnea none. Palpitations none. PND (paroxsymal nocturnal dyspnea) none.   Gastrointestinal:   Patient denies abdominal pain, blood in stool, constipation, diarrhea, difficulty swallowing, change in stools, nausea, vomiting no weight changes noted no heart burn noted. Prolapsed rectum does have fecal smearing patient has to wear pads  Hematology:   Patient denies abnormal bleeding, easy bleeding, easy bruising. Enlarged lymph nodes none.   Women Only:    Patient denies breast pain.axillary nodes . Breast lumps or discharge none. Mammogram up-to-date yes -history of uterine prolapse see GYN uro-prn   Genitourinary:   Patient denies difficulty urinating, frequent urination, hematuria. Dysuria none. Nocturia None. Urinary frequency no. Urinary incontinence no.   Musculoskeletal:   Arthritis No. Back pain no. Joint pain left knee pain .  Also has difficulty in using the left shoulder has a history of osteoarthritis joint stiffness no. Muscle weakness none.   Peripheral Vascular:   General no varicosities, no claudication.   Dermatologic:   Rash no. Sees skin md dr Polo yearly   Neurologic:   Patient denies dizziness, fainting, loss of consciousness, weakness. Memory loss none. Tingling/numbness none. Trouble with balance none.    Psychiatric:   Patient denies depression, hallucinations, memory loss. Anxiety none. Insomnia none.        EXAM:   /60 (BP Location: Left arm, Patient Position: Sitting, Cuff Size: adult)   Pulse 71   Temp 97 °F (36.1 °C) (Temporal)   Resp 16   Ht 5' 6.5\" (1.689 m)   Wt 143 lb (64.9 kg)   SpO2 96%   BMI 22.74 kg/m²    >   BP Readings from Last 3 Encounters:   02/24/25 138/60   04/16/24 136/60   10/10/23 124/68     General/Constitutional:   General able to do usual activities, good exercise tolerance, good general state of health, no fatigue, no fever, no weakness, no weight loss or gain .   HEENT/Neck:   Head  no dizziness, no lightheadedness. Eyes normal vision, no redness, no drainage. Ears no earaches, no fullness, normal hearing, no tinnitus. Nose and Sinuses no recurrent colds, no discharge, no itching, no hay fever, no nosebleeds, no sinus trouble. Mouth and Pharynx no sore throats, no hoarseness. Neck no lumps, no goiter, no neck stiffness or pain.   Endocrine:   Diabetes none. Thyroid disorder none.   Respiratory:   Patient denies chest pain, cough, MARTINEZ (dyspnea on exertion), chest congestion, blood-tinged  sputum/wheezing. Breathing normal pattern .   Cardiovascular:   Patient denies chest pain, shortness of breath/rheumatic fever/murmur/dizzziness . Leg edema none. Orthopnea none. Palpitations none. PND (paroxsymal nocturnal dyspnea) none.   Gastrointestinal:   Patient denies abdominal pain, blood in stool, constipation, diarrhea, difficulty swallowing, change in stools, nausea, vomiting no weight changes noted no heart burn noted. Prolapsed rectum does have fecal smearing patient has to wear pads  Hematology:   Patient denies abnormal bleeding, easy bleeding, easy bruising. Enlarged lymph nodes none.   Women Only:   Patient denies breast pain.axillary nodes . Breast lumps or discharge none. Mammogram up-to-date yes -history of uterine prolapse see GYN uro-  Genitourinary:   Patient denies difficulty urinating, frequent urination, hematuria. Dysuria none. Nocturia None. Urinary frequency no. Urinary incontinence no.   Musculoskeletal:   Arthritis No. Back pain no. Joint pain left knee pain .  Also has difficulty in using the left shoulder has a history of osteoarthritis joint stiffness no. Muscle weakness none.   Peripheral Vascular:   General no varicosities, no claudication.   Dermatologic:   Rash no. Sees skin md dr Polo   Neurologic:   Patient denies dizziness, fainting, loss of consciousness, weakness. Memory loss none. Tingling/numbness none. Trouble with balance none.    Psychiatric:   Patient denies depression, hallucinations, memory loss. Anxiety none. Insomnia none.       EXAM:   /60 (BP Location: Left arm, Patient Position: Sitting, Cuff Size: adult)   Pulse 72   Temp 97 °F (36.1 °C) (Temporal)   Resp 16   Ht 5' 6.5\" (1.689 m)   Wt 150 lb (68 kg)   SpO2 97%   BMI 23.85 kg/m²                >       BP Readings from Last 3 Encounters:   04/16/24 136/60   10/10/23 124/68   10/02/23 150/70      GENERAL:   Build: normal .   General Appearance: alert and oriented, pleasant.   HEENT:   Ear canals:  normal.   EOM: within normal limit-conjunctiva normal.   Head: normocephalic.   Nasal septum: midline.   Nose: unremarkable.   Oral cavity: normal.   Pupils: normal, bilaterally .   Sclera: normal.   Turbinates: normal.   NECK:   Carotid bruit: none.   Cervical lymph nodes: unremarkable.   JVD: none.   Range of Motion: normal.   Thyroid: unremarkable.   HEART:   Clicks: absent .   Edema: none visible .   Heart sounds: normal.   Murmurs: none.   Rhythm: regular.   LUNGS:   Auscultation: clear .   Chest Shape: normal .   Percussion: normal.   Rales: no .   Respiratory effort: normal .   Rhonchi: no.   Wheezes: no.   ABDOMEN:   General: normal.   Hernia: absent.   Inguinal nodes: none.   Liver, Spleen: non-enlarged.   Rebound tenderness: absent.   Tenderness: absent .   EXTREMITIES:   Clubbing: none.   Cyanosis: absent .   Edema: none.   Pulses: present, bilateral.   Tremors: no.   Varicose veins: not present.   MUSCULOSKELETAL:   Cervical spines: normal.   L-S spines: normal.   Lower extremity joints: oa knees ,bunion r great toe with hammer toes noted .   Upper extremity joints: normal.  Except left and right shoulder movements are markedly diminished patient does not desire any work-up this is chronic  NEUROLOGICAL:   Babinski: negative/all reflexes are normal.   Cerebellar Testing grossly/intact: yes.   Cranial Nerves: CN's II-XII grossly intact.   Gait: normal.   Mental Status: Alert & oriented x 3.   Motor: power-normal/tone -normal/co-ordination normal/wasting -none/involuntary movements -none.   Reflexes: normal.   Sensory: normal sensation to all modalities.   LYMPHATICS:   Groin: no adenopathy .   Inguinal: no adenopathy.   Supraclavicular: none.   DERMATOLOGY:   Rash: no. Will see derm for mole ck Dr. Polo     BREASTS:   Appearance: symmetrical.   tenderness none.   Axilla: unremarkable, no palpable masses.   Breast Mass: no palpable masses.   Nipple Abnormality: none, no discharge.   Skin Change:  unremarkable.      ASSESSMENT AND OTHER RELEVANT CHRONIC CONDITIONS:   Radha Sifuentes is a 84 year old female who presents for a Medicare Assessment.     PLAN SUMMARY:   Diagnoses and all orders for this visit:    Routine general medical examination at a health care facility  -     Comp Metabolic Panel (14)  -     CBC With Differential With Platelet  -     Hemoglobin A1C  -     Assay, Thyroid Stim Hormone  -     Urinalysis, Routine  -     EKG 12 Lead performed at [facility]; Future    Senile osteoporosis    Pure hypercholesterolemia  -     Lipid Panel    Essential hypertension    Prolapsed uterus    History of skin cancer    Primary osteoarthritis of left shoulder    All above are stable and pending labs.  Patient does not want any aggressive measures  The patient indicates understanding of these issues and agrees to the plan.  The patient is asked to return in 6 months for medication check  Diet counseling perfomed    SUGGESTED VACCINATIONS - Influenza, Pneumococcal, Zoster, Tetanus   Influenza: No recommendations at this time  Pneumonia: No recommendations at this time               [1]   Allergies  Allergen Reactions    Celebrex [Celecoxib]      Headache

## 2025-03-12 DIAGNOSIS — E78.00 PURE HYPERCHOLESTEROLEMIA: Chronic | ICD-10-CM

## 2025-03-12 DIAGNOSIS — I10 ESSENTIAL HYPERTENSION: ICD-10-CM

## 2025-03-12 NOTE — TELEPHONE ENCOUNTER
LOV: 2/24/25   RTC: 6 months  Filled: 5/24/24 #90 3 refills   Labs: 2/13/23   No future appointments.

## 2025-03-13 RX ORDER — AMLODIPINE AND BENAZEPRIL HYDROCHLORIDE 5; 10 MG/1; MG/1
CAPSULE ORAL
Qty: 90 CAPSULE | Refills: 0 | Status: SHIPPED | OUTPATIENT
Start: 2025-03-13

## 2025-03-13 RX ORDER — PRAVASTATIN SODIUM 20 MG
TABLET ORAL
Qty: 90 TABLET | Refills: 0 | Status: SHIPPED | OUTPATIENT
Start: 2025-03-13

## 2025-05-27 ENCOUNTER — APPOINTMENT (OUTPATIENT)
Dept: URBAN - METROPOLITAN AREA CLINIC 242 | Age: 85
Setting detail: DERMATOLOGY
End: 2025-05-27

## 2025-05-27 DIAGNOSIS — L82.0 INFLAMED SEBORRHEIC KERATOSIS: ICD-10-CM

## 2025-05-27 DIAGNOSIS — D22 MELANOCYTIC NEVI: ICD-10-CM

## 2025-05-27 DIAGNOSIS — L21.8 OTHER SEBORRHEIC DERMATITIS: ICD-10-CM

## 2025-05-27 DIAGNOSIS — D18.0 HEMANGIOMA: ICD-10-CM

## 2025-05-27 DIAGNOSIS — L57.0 ACTINIC KERATOSIS: ICD-10-CM

## 2025-05-27 DIAGNOSIS — L82.1 OTHER SEBORRHEIC KERATOSIS: ICD-10-CM

## 2025-05-27 PROBLEM — D22.5 MELANOCYTIC NEVI OF TRUNK: Status: ACTIVE | Noted: 2025-05-27

## 2025-05-27 PROBLEM — D18.01 HEMANGIOMA OF SKIN AND SUBCUTANEOUS TISSUE: Status: ACTIVE | Noted: 2025-05-27

## 2025-05-27 PROCEDURE — 17110 DESTRUCT B9 LESION 1-14: CPT

## 2025-05-27 PROCEDURE — OTHER LIQUID NITROGEN: OTHER

## 2025-05-27 PROCEDURE — OTHER COUNSELING: OTHER

## 2025-05-27 PROCEDURE — OTHER PRESCRIPTION MEDICATION MANAGEMENT: OTHER

## 2025-05-27 PROCEDURE — 99213 OFFICE O/P EST LOW 20 MIN: CPT | Mod: 25

## 2025-05-27 PROCEDURE — 17000 DESTRUCT PREMALG LESION: CPT | Mod: 59

## 2025-05-27 PROCEDURE — 17003 DESTRUCT PREMALG LES 2-14: CPT | Mod: 59

## 2025-05-27 PROCEDURE — OTHER MIPS QUALITY: OTHER

## 2025-05-27 ASSESSMENT — LOCATION DETAILED DESCRIPTION DERM
LOCATION DETAILED: SUPERIOR THORACIC SPINE
LOCATION DETAILED: RIGHT DISTAL DORSAL FOREARM
LOCATION DETAILED: MID-FRONTAL SCALP
LOCATION DETAILED: RIGHT ULNAR DORSAL HAND
LOCATION DETAILED: EPIGASTRIC SKIN
LOCATION DETAILED: RIGHT LATERAL SUPERIOR CHEST
LOCATION DETAILED: RIGHT ANTERIOR SHOULDER
LOCATION DETAILED: LEFT INFERIOR CENTRAL MALAR CHEEK
LOCATION DETAILED: LEFT INFERIOR LATERAL NECK
LOCATION DETAILED: RIGHT RADIAL DORSAL HAND

## 2025-05-27 ASSESSMENT — LOCATION SIMPLE DESCRIPTION DERM
LOCATION SIMPLE: UPPER BACK
LOCATION SIMPLE: ANTERIOR SCALP
LOCATION SIMPLE: ABDOMEN
LOCATION SIMPLE: RIGHT FOREARM
LOCATION SIMPLE: RIGHT SHOULDER
LOCATION SIMPLE: CHEST
LOCATION SIMPLE: RIGHT HAND
LOCATION SIMPLE: LEFT ANTERIOR NECK
LOCATION SIMPLE: LEFT CHEEK

## 2025-05-27 ASSESSMENT — LOCATION ZONE DERM
LOCATION ZONE: HAND
LOCATION ZONE: TRUNK
LOCATION ZONE: ARM
LOCATION ZONE: SCALP
LOCATION ZONE: FACE
LOCATION ZONE: NECK

## 2025-05-28 DIAGNOSIS — I10 ESSENTIAL HYPERTENSION: ICD-10-CM

## 2025-05-28 DIAGNOSIS — E78.00 PURE HYPERCHOLESTEROLEMIA: Chronic | ICD-10-CM

## 2025-05-29 NOTE — TELEPHONE ENCOUNTER
Requesting    amLODIPine Besy-Benazepril HCl 5-10 MG Oral Cap         Sig: Take 1 capsule by mouth daily.    Disp: 90 capsule    Refills: 3    Start: 5/28/2025    Class: Normal    Non-formulary For: Essential hypertension    Last ordered: 2 months ago (3/13/2025) by Rm Tuttle MD    Hypertension Medications Protocol [id number]/28/2025 03:25 PM   Protocol Details CMP or BMP in past 12 months    EGFRCR or GFRNAA > 50    Last BP reading less than 140/90    In person appointment or virtual visit in the past 12 mos or appointment in next 3 mos    Medication is active on med list       pravastatin 20 MG Oral Tab         Sig: Take 1 tablet (20 mg total) by mouth nightly.    Disp: 90 tablet    Refills: 3    Start: 5/28/2025    Class: Normal    Non-formulary For: Pure hypercholesterolemia    Last ordered: 2 months ago (3/13/2025) by Rm Tuttle MD    Cholesterol Medication Protocol [id number]/28/2025 03:25 PM   Protocol Details ALT < 80    ALT resulted within past year    Lipid panel within past 12 months    In person appointment or virtual visit in the past 12 mos or appointment in next 3 mos    Medication is active on med list        LOV: 2/24/2025  RTC: 6 months   Last Relevant Labs: 2/13/2023  Filled: 3/13/2025 #90 with 0 refills    No future appointments.    
Statement Selected

## 2025-05-30 RX ORDER — AMLODIPINE AND BENAZEPRIL HYDROCHLORIDE 5; 10 MG/1; MG/1
1 CAPSULE ORAL DAILY
Qty: 90 CAPSULE | Refills: 3 | Status: SHIPPED | OUTPATIENT
Start: 2025-05-30

## 2025-05-30 RX ORDER — PRAVASTATIN SODIUM 20 MG
20 TABLET ORAL NIGHTLY
Qty: 90 TABLET | Refills: 3 | Status: SHIPPED | OUTPATIENT
Start: 2025-05-30

## 2025-08-01 DIAGNOSIS — I10 ESSENTIAL HYPERTENSION: ICD-10-CM

## 2025-08-01 DIAGNOSIS — M81.0 SENILE OSTEOPOROSIS: ICD-10-CM

## 2025-08-01 RX ORDER — ALENDRONATE SODIUM 70 MG/1
70 TABLET ORAL WEEKLY
Qty: 12 TABLET | Refills: 3 | OUTPATIENT
Start: 2025-08-01 | End: 2026-08-01

## (undated) DIAGNOSIS — E78.00 PURE HYPERCHOLESTEROLEMIA: Chronic | ICD-10-CM

## (undated) DIAGNOSIS — I10 ESSENTIAL HYPERTENSION: ICD-10-CM

## (undated) NOTE — LETTER
09/15/21        [place] [address] [phone]      Dear [facility] Spotted records indicate that you have outstanding lab work and or testing that was ordered for you and has not yet been completed:  Orders Placed This Encounter

## (undated) NOTE — LETTER
24    Patient: Radha Sifuentes  : 10/10/1940 Visit date: 6/10/2024    Dear  Rm Tuttle MD    Thank you for referring Radha Sifuentes to my practice.  Please find my assessment and plan below.     Assessment   1. Full incontinence of feces        This is a very nice 83-year-old female who was referred to me for evaluation of fecal incontinence.  Patient has had episodes of fecal incontinence for at least 2 years now.  She typically has around 1 incontinence episode day where she leaks solid stool into her undergarments.  She wears a pad to control the leakage.  She generally has around 1 bowel movement a day in the morning.  She currently takes fiber daily.  She does not take any antidiarrheals.  Patient states her stool consistency varies depending on what she eats.  She states that time she is constipated and at times the stool is loose.  Patient lives independently.  She states the fecal incontinence currently is not interfering with her quality of life because she is able to control her symptoms by wearing a pad.  She has never tried pelvic floor physical therapy.    Patient has had 1 vaginal delivery and does not recall having an episiotomy or any severe obstetric injury.  No prior anorectal surgery.  She denies any urinary incontinence.  She denies any rectal pain or prolapse.  She does endorse uterine prolapse.  She has rare rectal bleeding that she attributes to hemorrhoids.  Her most recent colonoscopy was performed by Dr. Bell in 2018 with findings of diverticulosis and internal hemorrhoids.  He did not recommend any further screening colonoscopies.    External exam of the anus is normal.  Digital rectal exam shows poor tone and patient was not able to elicit any squeeze pressure.  There are no masses, bleeding, drainage or tenderness.  Anoscopy reveals redundant, prolapsing distal rectal mucosa that appears pink and healthy.    Plan  Nonoperative treatment options for fecal incontinence  discussed with patient and generally include fiber supplementation, antidiarrheals as needed and pelvic floor physical therapy.  Patient is already taking a daily fiber supplement.  We discussed possibly trying to increase frequency to twice a day to see if this will improve her incontinence symptoms.  I also strongly encouraged her to consider pelvic floor physical therapy.  Patient states she is quite busy during the week but will consider this.  She did take a handout on the pelvic floor physical therapy department available through [facility].    Surgical treatment options for fecal incontinence are typically reserved for patients who have life-limiting symptoms despite maximal nonoperative therapy.  These would include sacral neuromodulation, sphincteroplasty and colostomy creation.  At this point, patient was not interested in considering any surgical treatment options given the fact that her incontinence is not affecting her quality of life.  This is very reasonable.  Patient should follow-up with me as needed if her incontinence episodes worsen or begin to affect her quality of life.  Patient expressed understanding and was agreeable to plan.      Sincerely,       Alexsander Lima MD   CC:   No Recipients

## (undated) NOTE — LETTER
06/01/21        [place] [address] [phone]      Dear Carlie Chávez records indicate that you have outstanding lab work and or testing that was ordered for you and has not yet been completed:  Orders Placed This Encounter

## (undated) NOTE — LETTER
07/23/18        [place] [address] [phone]      Dear Anatoliy Gerard records indicate that you have outstanding lab work and or testing that was ordered for you and has not yet been completed:          Lipid Panel      Hepa

## (undated) NOTE — MR AVS SNAPSHOT
[place]  [address] [facility] [address]  [place] [phone]  [phone]               Thank you for choosing us for your health care visit with Daquan Moss MD.  We are glad to serve you and happy to provide you with this puentes Thyroxine (T4) Total    Complete by:  As directed        Hemoglobin A1C    Complete by:  As directed        Lipid Panel    Complete by:  As directed        Comp Metabolic Panel (14)    Complete by:  As directed        CBC W Differential W Platelet    Comp Encounter for long-term (current) use of medications [id number]           Pure hypercholesterolemia [2616]           Pure hypercholesterolemia [2616]           Screening, anemia, deficiency, iron [27962]           Screening for blood or protein in ur Dietary sodium reduction Reduce dietary sodium intake to <= 100 mmol per day (2.4 g sodium or 6 g sodium chloride)   Aerobic physical activity Regular aerobic physical activity (e.g., brisk walking, light jogging, cycling, swimming, etc.) for a goal of at